# Patient Record
Sex: FEMALE | Race: WHITE | HISPANIC OR LATINO | Employment: OTHER | URBAN - METROPOLITAN AREA
[De-identification: names, ages, dates, MRNs, and addresses within clinical notes are randomized per-mention and may not be internally consistent; named-entity substitution may affect disease eponyms.]

---

## 2020-11-16 ENCOUNTER — APPOINTMENT (OUTPATIENT)
Dept: LAB | Facility: MEDICAL CENTER | Age: 70
End: 2020-11-16
Attending: PHYSICIAN ASSISTANT
Payer: MEDICARE

## 2020-11-17 ENCOUNTER — HOSPITAL ENCOUNTER (OUTPATIENT)
Dept: LAB | Facility: MEDICAL CENTER | Age: 70
End: 2020-11-17
Attending: PHYSICIAN ASSISTANT
Payer: MEDICARE

## 2020-11-17 LAB
ALBUMIN SERPL BCP-MCNC: 4.3 G/DL (ref 3.2–4.9)
ALBUMIN/GLOB SERPL: 1.3 G/DL
ALP SERPL-CCNC: 92 U/L (ref 30–99)
ALT SERPL-CCNC: 15 U/L (ref 2–50)
ANION GAP SERPL CALC-SCNC: 8 MMOL/L (ref 7–16)
AST SERPL-CCNC: 15 U/L (ref 12–45)
BILIRUB SERPL-MCNC: 0.5 MG/DL (ref 0.1–1.5)
BUN SERPL-MCNC: 17 MG/DL (ref 8–22)
CALCIUM SERPL-MCNC: 9.5 MG/DL (ref 8.5–10.5)
CHLORIDE SERPL-SCNC: 104 MMOL/L (ref 96–112)
CO2 SERPL-SCNC: 29 MMOL/L (ref 20–33)
CREAT SERPL-MCNC: 0.67 MG/DL (ref 0.5–1.4)
GLOBULIN SER CALC-MCNC: 3.2 G/DL (ref 1.9–3.5)
GLUCOSE SERPL-MCNC: 99 MG/DL (ref 65–99)
POTASSIUM SERPL-SCNC: 4.4 MMOL/L (ref 3.6–5.5)
PROT SERPL-MCNC: 7.5 G/DL (ref 6–8.2)
SODIUM SERPL-SCNC: 141 MMOL/L (ref 135–145)

## 2020-11-17 PROCEDURE — 80053 COMPREHEN METABOLIC PANEL: CPT

## 2020-11-17 PROCEDURE — 36415 COLL VENOUS BLD VENIPUNCTURE: CPT

## 2021-03-03 DIAGNOSIS — Z23 NEED FOR VACCINATION: ICD-10-CM

## 2021-06-30 ENCOUNTER — HOSPITAL ENCOUNTER (EMERGENCY)
Facility: MEDICAL CENTER | Age: 71
End: 2021-06-30
Attending: EMERGENCY MEDICINE
Payer: OTHER MISCELLANEOUS

## 2021-06-30 ENCOUNTER — APPOINTMENT (OUTPATIENT)
Dept: RADIOLOGY | Facility: MEDICAL CENTER | Age: 71
End: 2021-06-30
Attending: EMERGENCY MEDICINE
Payer: OTHER MISCELLANEOUS

## 2021-06-30 VITALS
SYSTOLIC BLOOD PRESSURE: 124 MMHG | BODY MASS INDEX: 38.28 KG/M2 | RESPIRATION RATE: 11 BRPM | OXYGEN SATURATION: 93 % | HEIGHT: 60 IN | HEART RATE: 86 BPM | TEMPERATURE: 97.9 F | DIASTOLIC BLOOD PRESSURE: 58 MMHG | WEIGHT: 195 LBS

## 2021-06-30 DIAGNOSIS — S20.211A CONTUSION OF RIGHT CHEST WALL, INITIAL ENCOUNTER: ICD-10-CM

## 2021-06-30 DIAGNOSIS — S30.1XXA CONTUSION OF ABDOMINAL WALL, INITIAL ENCOUNTER: ICD-10-CM
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
## 2021-06-30 DIAGNOSIS — N63.20 BREAST MASS, LEFT: ICD-10-CM

## 2021-06-30 LAB
ALBUMIN SERPL BCP-MCNC: 4.6 G/DL (ref 3.2–4.9)
ALBUMIN/GLOB SERPL: 1.5 G/DL
ALP SERPL-CCNC: 104 U/L (ref 30–99)
ALT SERPL-CCNC: 18 U/L (ref 2–50)
ANION GAP SERPL CALC-SCNC: 11 MMOL/L (ref 7–16)
APTT PPP: 32.5 SEC (ref 24.7–36)
AST SERPL-CCNC: 24 U/L (ref 12–45)
BASOPHILS # BLD AUTO: 0.5 % (ref 0–1.8)
BASOPHILS # BLD: 0.05 K/UL (ref 0–0.12)
BILIRUB SERPL-MCNC: 0.2 MG/DL (ref 0.1–1.5)
BUN SERPL-MCNC: 19 MG/DL (ref 8–22)
CALCIUM SERPL-MCNC: 9.3 MG/DL (ref 8.5–10.5)
CHLORIDE SERPL-SCNC: 105 MMOL/L (ref 96–112)
CO2 SERPL-SCNC: 25 MMOL/L (ref 20–33)
CREAT SERPL-MCNC: 0.73 MG/DL (ref 0.5–1.4)
EKG IMPRESSION: NORMAL
EOSINOPHIL # BLD AUTO: 0.31 K/UL (ref 0–0.51)
EOSINOPHIL NFR BLD: 3.2 % (ref 0–6.9)
ERYTHROCYTE [DISTWIDTH] IN BLOOD BY AUTOMATED COUNT: 43.8 FL (ref 35.9–50)
GLOBULIN SER CALC-MCNC: 3 G/DL (ref 1.9–3.5)
GLUCOSE SERPL-MCNC: 131 MG/DL (ref 65–99)
HCT VFR BLD AUTO: 38.5 % (ref 37–47)
HGB BLD-MCNC: 12.8 G/DL (ref 12–16)
IMM GRANULOCYTES # BLD AUTO: 0.07 K/UL (ref 0–0.11)
IMM GRANULOCYTES NFR BLD AUTO: 0.7 % (ref 0–0.9)
INR PPP: 1.03 (ref 0.87–1.13)
LYMPHOCYTES # BLD AUTO: 2.91 K/UL (ref 1–4.8)
LYMPHOCYTES NFR BLD: 30.3 % (ref 22–41)
MCH RBC QN AUTO: 30.6 PG (ref 27–33)
MCHC RBC AUTO-ENTMCNC: 33.2 G/DL (ref 33.6–35)
MCV RBC AUTO: 92.1 FL (ref 81.4–97.8)
MONOCYTES # BLD AUTO: 0.75 K/UL (ref 0–0.85)
MONOCYTES NFR BLD AUTO: 7.8 % (ref 0–13.4)
NEUTROPHILS # BLD AUTO: 5.5 K/UL (ref 2–7.15)
NEUTROPHILS NFR BLD: 57.5 % (ref 44–72)
NRBC # BLD AUTO: 0 K/UL
NRBC BLD-RTO: 0 /100 WBC
PLATELET # BLD AUTO: 232 K/UL (ref 164–446)
PMV BLD AUTO: 10.6 FL (ref 9–12.9)
POTASSIUM SERPL-SCNC: 3.8 MMOL/L (ref 3.6–5.5)
PROT SERPL-MCNC: 7.6 G/DL (ref 6–8.2)
PROTHROMBIN TIME: 13.2 SEC (ref 12–14.6)
RBC # BLD AUTO: 4.18 M/UL (ref 4.2–5.4)
SODIUM SERPL-SCNC: 141 MMOL/L (ref 135–145)
WBC # BLD AUTO: 9.6 K/UL (ref 4.8–10.8)

## 2021-06-30 PROCEDURE — 700111 HCHG RX REV CODE 636 W/ 250 OVERRIDE (IP): Performed by: EMERGENCY MEDICINE

## 2021-06-30 PROCEDURE — 72128 CT CHEST SPINE W/O DYE: CPT | Mod: MH

## 2021-06-30 PROCEDURE — 99285 EMERGENCY DEPT VISIT HI MDM: CPT

## 2021-06-30 PROCEDURE — 93005 ELECTROCARDIOGRAM TRACING: CPT | Performed by: EMERGENCY MEDICINE

## 2021-06-30 PROCEDURE — 80053 COMPREHEN METABOLIC PANEL: CPT

## 2021-06-30 PROCEDURE — 85730 THROMBOPLASTIN TIME PARTIAL: CPT

## 2021-06-30 PROCEDURE — 96375 TX/PRO/DX INJ NEW DRUG ADDON: CPT | Mod: XU

## 2021-06-30 PROCEDURE — 85025 COMPLETE CBC W/AUTO DIFF WBC: CPT

## 2021-06-30 PROCEDURE — 72125 CT NECK SPINE W/O DYE: CPT

## 2021-06-30 PROCEDURE — 73562 X-RAY EXAM OF KNEE 3: CPT | Mod: LT

## 2021-06-30 PROCEDURE — 73501 X-RAY EXAM HIP UNI 1 VIEW: CPT | Mod: LT

## 2021-06-30 PROCEDURE — 85610 PROTHROMBIN TIME: CPT

## 2021-06-30 PROCEDURE — 71260 CT THORAX DX C+: CPT | Mod: MH

## 2021-06-30 PROCEDURE — 96374 THER/PROPH/DIAG INJ IV PUSH: CPT | Mod: XU

## 2021-06-30 PROCEDURE — 93005 ELECTROCARDIOGRAM TRACING: CPT

## 2021-06-30 PROCEDURE — 70450 CT HEAD/BRAIN W/O DYE: CPT

## 2021-06-30 PROCEDURE — 700117 HCHG RX CONTRAST REV CODE 255: Performed by: EMERGENCY MEDICINE

## 2021-06-30 PROCEDURE — 72131 CT LUMBAR SPINE W/O DYE: CPT | Mod: MH

## 2021-06-30 RX ORDER — ONDANSETRON 2 MG/ML
4 INJECTION INTRAMUSCULAR; INTRAVENOUS ONCE
Status: COMPLETED | OUTPATIENT
Start: 2021-06-30 | End: 2021-06-30

## 2021-06-30 RX ORDER — CYCLOBENZAPRINE HCL 10 MG
10 TABLET ORAL 3 TIMES DAILY PRN
Qty: 30 TABLET | Refills: 0 | Status: SHIPPED | OUTPATIENT
Start: 2021-06-30 | End: 2021-08-30 | Stop reason: SDUPTHER

## 2021-06-30 RX ORDER — MORPHINE SULFATE 4 MG/ML
4 INJECTION, SOLUTION INTRAMUSCULAR; INTRAVENOUS ONCE
Status: COMPLETED | OUTPATIENT
Start: 2021-06-30 | End: 2021-06-30

## 2021-06-30 RX ORDER — HYDROCODONE BITARTRATE AND ACETAMINOPHEN 5; 325 MG/1; MG/1
1 TABLET ORAL EVERY 6 HOURS PRN
Qty: 10 TABLET | Refills: 0 | Status: SHIPPED | OUTPATIENT
Start: 2021-06-30 | End: 2021-07-03

## 2021-06-30 RX ADMIN — MORPHINE SULFATE 4 MG: 4 INJECTION INTRAVENOUS at 19:13

## 2021-06-30 RX ADMIN — IOHEXOL 100 ML: 350 INJECTION, SOLUTION INTRAVENOUS at 20:04

## 2021-06-30 RX ADMIN — ONDANSETRON 4 MG: 2 INJECTION INTRAMUSCULAR; INTRAVENOUS at 19:13

## 2021-07-01 NOTE — ED NOTES
Gibraltarian interpretor used for discharge instructions.   Discharge instructions given to patient, prescriptions provided, a verbal understanding of all instructions was stated. IV removed, cathlon intact, site without s/s of infection. Pt preferred to be taken by wheel chair out to lobby accompanied by family VSS,  all belongings accounted for.

## 2021-07-01 NOTE — ED NOTES
Report form Ester SCHROEDER, assume care. Pt medicated per MAR for pain, transported to imaging via [facility]
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
## 2021-07-02 ENCOUNTER — PATIENT OUTREACH (OUTPATIENT)
Dept: HEALTH INFORMATION MANAGEMENT | Facility: OTHER | Age: 71
End: 2021-07-02

## 2021-07-02 NOTE — PROGRESS NOTES
7.2.21   Received VM from [facility] to schedule patient with breast center for a breast mass. Patient will need to be seen with a PCP first to receive a referral. CHW called patient but the line was busy. CHW unable to leave a VM. CHW will call patient a second time to schedule PCP appointment.     7.7.21  CHW called patient a second time to schedule PCP appointment for breast center referral but there was no answer. CHW will call patient a third time to schedule PCP appointment.     7.9.21   CHW called patient a third time to schedule PCP appointment. Patient's line rang busy so CHW unable to leave a VM for patient. CHW will discharge patient from list due to inability to contact.

## 2021-07-12 ENCOUNTER — OFFICE VISIT (OUTPATIENT)
Dept: MEDICAL GROUP | Facility: PHYSICIAN GROUP | Age: 71
End: 2021-07-12
Payer: MEDICARE

## 2021-07-12 ENCOUNTER — TELEPHONE (OUTPATIENT)
Dept: SCHEDULING | Facility: IMAGING CENTER | Age: 71
End: 2021-07-12

## 2021-07-12 VITALS
TEMPERATURE: 97.8 F | BODY MASS INDEX: 41.43 KG/M2 | DIASTOLIC BLOOD PRESSURE: 74 MMHG | HEIGHT: 60 IN | SYSTOLIC BLOOD PRESSURE: 132 MMHG | HEART RATE: 83 BPM | WEIGHT: 211 LBS | OXYGEN SATURATION: 93 % | RESPIRATION RATE: 16 BRPM

## 2021-07-12 DIAGNOSIS — N63.20 MASS OF BREAST, LEFT: ICD-10-CM

## 2021-07-12 DIAGNOSIS — I10 ESSENTIAL HYPERTENSION: ICD-10-CM

## 2021-07-12 DIAGNOSIS — Z00.00 ENCOUNTER FOR HEALTH MAINTENANCE EXAMINATION IN ADULT: ICD-10-CM

## 2021-07-12 DIAGNOSIS — M54.16 LUMBAR RADICULOPATHY: ICD-10-CM

## 2021-07-12 DIAGNOSIS — Z13.1 ENCOUNTER FOR SCREENING FOR DIABETES MELLITUS: ICD-10-CM

## 2021-07-12 DIAGNOSIS — Z13.220 ENCOUNTER FOR SCREENING FOR LIPID DISORDER: ICD-10-CM

## 2021-07-12 DIAGNOSIS — F41.9 ANXIETY: ICD-10-CM

## 2021-07-12 DIAGNOSIS — Z13.29 SCREENING FOR THYROID DISORDER: ICD-10-CM

## 2021-07-12 DIAGNOSIS — Z12.31 ENCOUNTER FOR SCREENING MAMMOGRAM FOR MALIGNANT NEOPLASM OF BREAST: ICD-10-CM

## 2021-07-12 DIAGNOSIS — Z13.21 ENCOUNTER FOR VITAMIN DEFICIENCY SCREENING: ICD-10-CM

## 2021-07-12 DIAGNOSIS — R92.8 OTHER ABNORMAL AND INCONCLUSIVE FINDINGS ON DIAGNOSTIC IMAGING OF BREAST: ICD-10-CM
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
## 2021-07-12 PROCEDURE — 99205 OFFICE O/P NEW HI 60 MIN: CPT | Performed by: NURSE PRACTITIONER

## 2021-07-12 RX ORDER — ALPRAZOLAM 0.5 MG/1
0.5 TABLET ORAL NIGHTLY PRN
COMMUNITY
End: 2021-07-12

## 2021-07-12 RX ORDER — ALPRAZOLAM 0.5 MG/1
0.5 TABLET ORAL 2 TIMES DAILY PRN
Qty: 60 TABLET | Refills: 0 | Status: SHIPPED | OUTPATIENT
Start: 2021-07-12 | End: 2021-08-11

## 2021-07-12 RX ORDER — NAPROXEN 375 MG/1
375 TABLET ORAL 2 TIMES DAILY WITH MEALS
Qty: 60 TABLET | Refills: 2 | Status: SHIPPED | OUTPATIENT
Start: 2021-07-12 | End: 2021-07-12

## 2021-07-12 RX ORDER — HYDROCODONE BITARTRATE AND ACETAMINOPHEN 5; 325 MG/1; MG/1
1 TABLET ORAL EVERY 8 HOURS PRN
Qty: 30 TABLET | Refills: 0 | Status: SHIPPED | OUTPATIENT
Start: 2021-07-12 | End: 2021-07-22

## 2021-07-12 RX ORDER — ENALAPRIL MALEATE 10 MG/1
20 TABLET ORAL DAILY
COMMUNITY
End: 2021-10-21 | Stop reason: SDUPTHER

## 2021-07-12 RX ORDER — NAPROXEN 375 MG/1
375 TABLET ORAL 2 TIMES DAILY WITH MEALS
Qty: 60 TABLET | Refills: 2 | Status: SHIPPED | OUTPATIENT
Start: 2021-07-12 | End: 2021-10-21

## 2021-07-12 RX ORDER — LISINOPRIL 20 MG/1
20 TABLET ORAL DAILY
COMMUNITY
End: 2021-07-12 | Stop reason: CLARIF

## 2021-07-12 ASSESSMENT — FIBROSIS 4 INDEX: FIB4 SCORE: 1.66

## 2021-07-12 ASSESSMENT — PATIENT HEALTH QUESTIONNAIRE - PHQ9: CLINICAL INTERPRETATION OF PHQ2 SCORE: 0
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
## 2021-07-13 NOTE — ASSESSMENT & PLAN NOTE
This is a chronic issue.  Patient takes enalapril 20 mg.  She was prescribed this in [place].  Her blood pressure is well controlled in office today.
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
## 2021-07-14 ENCOUNTER — HOSPITAL ENCOUNTER (OUTPATIENT)
Dept: LAB | Facility: MEDICAL CENTER | Age: 71
End: 2021-07-14
Attending: NURSE PRACTITIONER
Payer: MEDICARE

## 2021-07-14 DIAGNOSIS — Z13.29 SCREENING FOR THYROID DISORDER: ICD-10-CM

## 2021-07-14 DIAGNOSIS — Z13.220 ENCOUNTER FOR SCREENING FOR LIPID DISORDER: ICD-10-CM

## 2021-07-14 DIAGNOSIS — Z13.21 ENCOUNTER FOR VITAMIN DEFICIENCY SCREENING: ICD-10-CM

## 2021-07-14 DIAGNOSIS — Z00.00 ENCOUNTER FOR HEALTH MAINTENANCE EXAMINATION IN ADULT: ICD-10-CM

## 2021-07-15 ENCOUNTER — HOSPITAL ENCOUNTER (OUTPATIENT)
Dept: RADIOLOGY | Facility: MEDICAL CENTER | Age: 71
End: 2021-07-15
Attending: NURSE PRACTITIONER
Payer: MEDICARE

## 2021-07-15 ENCOUNTER — TELEPHONE (OUTPATIENT)
Dept: MEDICAL GROUP | Facility: PHYSICIAN GROUP | Age: 71
End: 2021-07-15

## 2021-07-15 ENCOUNTER — HOSPITAL ENCOUNTER (OUTPATIENT)
Dept: LAB | Facility: MEDICAL CENTER | Age: 71
End: 2021-07-15
Attending: NURSE PRACTITIONER
Payer: MEDICARE

## 2021-07-15 DIAGNOSIS — M54.16 LUMBAR RADICULOPATHY: ICD-10-CM
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
## 2021-07-15 DIAGNOSIS — S33.140D: ICD-10-CM

## 2021-07-15 LAB
25(OH)D3 SERPL-MCNC: 34 NG/ML (ref 30–100)
CHOLEST SERPL-MCNC: 181 MG/DL (ref 100–199)
ERYTHROCYTE [DISTWIDTH] IN BLOOD BY AUTOMATED COUNT: 46.8 FL (ref 35.9–50)
EST. AVERAGE GLUCOSE BLD GHB EST-MCNC: 114 MG/DL
FASTING STATUS PATIENT QL REPORTED: NORMAL
HBA1C MFR BLD: 5.6 % (ref 4–5.6)
HCT VFR BLD AUTO: 38.5 % (ref 37–47)
HDLC SERPL-MCNC: 57 MG/DL
HGB BLD-MCNC: 12.5 G/DL (ref 12–16)
LDLC SERPL CALC-MCNC: 109 MG/DL
MCH RBC QN AUTO: 30.9 PG (ref 27–33)
MCHC RBC AUTO-ENTMCNC: 32.5 G/DL (ref 33.6–35)
MCV RBC AUTO: 95.1 FL (ref 81.4–97.8)
PLATELET # BLD AUTO: 224 K/UL (ref 164–446)
PMV BLD AUTO: 10.7 FL (ref 9–12.9)
RBC # BLD AUTO: 4.05 M/UL (ref 4.2–5.4)
T4 FREE SERPL-MCNC: 1.13 NG/DL (ref 0.93–1.7)
TRIGL SERPL-MCNC: 74 MG/DL (ref 0–149)
TSH SERPL DL<=0.005 MIU/L-ACNC: 1.91 UIU/ML (ref 0.38–5.33)
WBC # BLD AUTO: 6.8 K/UL (ref 4.8–10.8)

## 2021-07-15 PROCEDURE — 85027 COMPLETE CBC AUTOMATED: CPT

## 2021-07-15 PROCEDURE — 80061 LIPID PANEL: CPT

## 2021-07-15 PROCEDURE — 84443 ASSAY THYROID STIM HORMONE: CPT

## 2021-07-15 PROCEDURE — 82306 VITAMIN D 25 HYDROXY: CPT

## 2021-07-15 PROCEDURE — 71111 X-RAY EXAM RIBS/CHEST4/> VWS: CPT

## 2021-07-15 PROCEDURE — 72148 MRI LUMBAR SPINE W/O DYE: CPT | Mod: MH

## 2021-07-15 PROCEDURE — 83036 HEMOGLOBIN GLYCOSYLATED A1C: CPT | Mod: GA

## 2021-07-15 PROCEDURE — 84439 ASSAY OF FREE THYROXINE: CPT

## 2021-07-15 PROCEDURE — 36415 COLL VENOUS BLD VENIPUNCTURE: CPT

## 2021-07-15 NOTE — TELEPHONE ENCOUNTER
----- Message from GALLITO Krishnamurthy sent at 7/15/2021 10:54 AM PDT -----  Patient is Canadian-speaking only.Please call patient to inform her of her x-ray and MRI results.  The x-ray of her chest does not show any rib fracture.The MRI results show that she has some disc bulging that is most likely pressing on her nerves which is causing her numbness and tingling and weakness of her leg.  I do recommend sending her to physiatry, who does specialize in pain management, as well as physical therapy to help relieve some of her discomfort.  Physical therapy can also determine if patient will benefit from any mobility aids such as a walker or cane.

## 2021-07-16 ENCOUNTER — TELEPHONE (OUTPATIENT)
Dept: MEDICAL GROUP | Facility: PHYSICIAN GROUP | Age: 71
End: 2021-07-16

## 2021-07-16 NOTE — TELEPHONE ENCOUNTER
Phone Number Called: [phone] (home)       Call outcome: Spoke to patient regarding message below.    Message: pt advised.

## 2021-07-16 NOTE — TELEPHONE ENCOUNTER
----- Message from GALLITO Krishnamurthy sent at 7/15/2021  5:46 PM PDT -----  Please call patient let her know her lab results are back.    Patient's labs look great.  She does not have diabetes.  Her vitamin D level is normal, however, on the lower side.  I would recommend either a multivitamin and or getting enough sun exposure for her body to be able to produce her own vitamin D.    Her cholesterol looks good.  She only has some mild elevation in one of her cholesterol levels.  All other levels look great.  No changes are needed at this time.

## 2021-07-16 NOTE — TELEPHONE ENCOUNTER
Phone Number Called: [phone] (home)       Call outcome: Spoke to patient regarding message below.    Message: pt advised.

## 2021-07-27 ENCOUNTER — OFFICE VISIT (OUTPATIENT)
Dept: MEDICAL GROUP | Facility: PHYSICIAN GROUP | Age: 71
End: 2021-07-27
Payer: MEDICARE

## 2021-07-27 ENCOUNTER — TELEPHONE (OUTPATIENT)
Dept: MEDICAL GROUP | Facility: PHYSICIAN GROUP | Age: 71
End: 2021-07-27

## 2021-07-27 VITALS
BODY MASS INDEX: 42.13 KG/M2 | SYSTOLIC BLOOD PRESSURE: 124 MMHG | OXYGEN SATURATION: 96 % | WEIGHT: 209 LBS | HEIGHT: 59 IN | DIASTOLIC BLOOD PRESSURE: 74 MMHG | HEART RATE: 75 BPM | TEMPERATURE: 98.3 F | RESPIRATION RATE: 16 BRPM

## 2021-07-27 DIAGNOSIS — M54.16 LUMBAR RADICULOPATHY: ICD-10-CM

## 2021-07-27 DIAGNOSIS — S33.140D: ICD-10-CM
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
## 2021-07-27 DIAGNOSIS — Z02.89 ENCOUNTER FOR COMPLETION OF FORM WITH PATIENT: ICD-10-CM

## 2021-07-27 PROCEDURE — 7101 PR PHYSICAL: Performed by: NURSE PRACTITIONER

## 2021-07-27 ASSESSMENT — FIBROSIS 4 INDEX: FIB4 SCORE: 1.717259325738758273

## 2021-07-27 NOTE — TELEPHONE ENCOUNTER
VOICEMAIL  1. Caller Name: [facility]                      Call Back Number: n/a    2. Message: DX code for Mammo is not passing medicare     3. Patient approves office to leave a detailed voicemail/MyChart message: yes
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
## 2021-07-30 ENCOUNTER — PHYSICAL THERAPY (OUTPATIENT)
Dept: PHYSICAL THERAPY | Facility: REHABILITATION | Age: 71
End: 2021-07-30
Attending: NURSE PRACTITIONER
Payer: MEDICARE
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
## 2021-07-30 DIAGNOSIS — M54.16 LUMBAR RADICULOPATHY: ICD-10-CM

## 2021-07-30 PROCEDURE — 97110 THERAPEUTIC EXERCISES: CPT

## 2021-07-30 PROCEDURE — 97163 PT EVAL HIGH COMPLEX 45 MIN: CPT

## 2021-07-30 SDOH — ECONOMIC STABILITY: GENERAL: QUALITY OF LIFE: POOR

## 2021-07-30 ASSESSMENT — ENCOUNTER SYMPTOMS
PAIN SCALE: 8
PAIN SCALE AT LOWEST: 0
PAIN SCALE AT HIGHEST: 8
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
## 2021-08-03 ENCOUNTER — HOSPITAL ENCOUNTER (OUTPATIENT)
Dept: RADIOLOGY | Facility: MEDICAL CENTER | Age: 71
End: 2021-08-03
Attending: NURSE PRACTITIONER
Payer: MEDICARE

## 2021-08-03 DIAGNOSIS — R92.8 OTHER ABNORMAL AND INCONCLUSIVE FINDINGS ON DIAGNOSTIC IMAGING OF BREAST: ICD-10-CM

## 2021-08-03 DIAGNOSIS — N63.20 MASS OF BREAST, LEFT: ICD-10-CM

## 2021-08-03 PROCEDURE — G0279 TOMOSYNTHESIS, MAMMO: HCPCS

## 2021-08-03 PROCEDURE — 76642 ULTRASOUND BREAST LIMITED: CPT | Mod: RT

## 2021-08-04 ENCOUNTER — HOSPITAL ENCOUNTER (OUTPATIENT)
Dept: RADIOLOGY | Facility: MEDICAL CENTER | Age: 71
End: 2021-08-04
Attending: NURSE PRACTITIONER
Payer: MEDICARE

## 2021-08-04 DIAGNOSIS — R92.8 ABNORMAL FINDINGS ON DIAGNOSTIC IMAGING OF BREAST: ICD-10-CM

## 2021-08-04 LAB — PATHOLOGY CONSULT NOTE: NORMAL

## 2021-08-04 PROCEDURE — 88305 TISSUE EXAM BY PATHOLOGIST: CPT

## 2021-08-04 PROCEDURE — 19083 BX BREAST 1ST LESION US IMAG: CPT | Mod: LT

## 2021-08-18 ENCOUNTER — PHYSICAL THERAPY (OUTPATIENT)
Dept: PHYSICAL THERAPY | Facility: REHABILITATION | Age: 71
End: 2021-08-18
Attending: NURSE PRACTITIONER
Payer: MEDICARE

## 2021-08-18 DIAGNOSIS — M54.16 LUMBAR RADICULOPATHY: ICD-10-CM
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
## 2021-08-18 PROCEDURE — 97110 THERAPEUTIC EXERCISES: CPT

## 2021-08-18 PROCEDURE — 97112 NEUROMUSCULAR REEDUCATION: CPT

## 2021-08-18 NOTE — OP THERAPY DAILY TREATMENT
"  Outpatient Physical Therapy  DAILY TREATMENT     [facility] Physical Therapy [address].  [address]  [place] [address]  Phone:  [phone]  Fax:  [phone]    Date: 08/18/2021    Patient: Sarah Royal  YOB: 1950  MRN: [id number]     Time Calculation    Start time: 0857  Stop time: 0950 Time Calculation (min): 53 minutes         Chief Complaint: No chief complaint on file.    Visit #: 2    SUBJECTIVE:   online service used during visit:  Pt states she is doing better. She feels the exercises have helped and the medicines. She has 1/2 the pn. She is having vertigo and not lifting b/c of that. If she turns quickly or bends over or laying down and grabbing tv remote. If she closes her eyes it's better. She describes it as spinning w/ quick eye mvnts.   She still has pn in her leg. She can walk as much as she wants but does still have to rest from pn after 30 min. She can stand for 15 min now before she has to sit.     OBJECTIVE:  Pos L roll test    Therapeutic Exercises (CPT 62838):     1. LTR, x10, short of pn    2. Heel slides for n glide, x10    3. L/s standing ext, x10, HEP    4. Bridge, unable    5. SKTC , 2x30\", HEP    6. SLR, 2x10, HEP    19. POC: 10/22/21      Therapeutic Exercise Summary: Access Code: [email or web address]  URL: [email or web address]  Date: 08/18/2021  Prepared by: Martha King    Exercises  Standing Lumbar Extension - 4 x daily - 7 x weekly - 1 sets - 10 reps  Hooklying Single Knee to Chest Stretch - 1 x daily - 7 x weekly - 2 sets - 30 graeme armendarizz  Small Range Straight Leg Raise - 1 x daily - 7 x weekly - 2 sets - 10 reps        Therapeutic Treatments and Modalities:     1. Therapeutic Activities (CPT 29584), see below    2. Neuromuscular Re-education (CPT 76254), see below    Therapeutic Treatment and Modalities Summary: Use of SPC to ambulate throughout home and outside to dec fall risk. Walk frequently throughout day.    Neuro-joy: " "Positive R roll test for R horizontal canalithasis BPPV. Latency: 2\", duration 10\" Treatment: CAROLINE siu, well tolerated.     Time-based treatments/modalities:    Physical Therapy Timed Treatment Charges  Neuromusc re-ed, balance, coor, post minutes (CPT 39688): 20 minutes  Therapeutic exercise minutes (CPT 74616): 33 minutes      ASSESSMENT:   Pt presents w/ sig reduced radiating pn today. She cont's to have poor bed mobility and pain w/ transitions and twisting. She was educated in log roll but was unable d/t BPPV. Tx for BPPV was performed d/t limiting progress on l/s tx. PTx2 tx pt for R horizontal BPPV after screening. PT's assisted pt w/ bed mobility/rolling. Pt was nauseous and after tx for several min but had full alleviation of nausea w/ sitting after 3 min. Pt was walked out to daughter. Pt was educated to sleep on L side for 72 hrs and sleep w/ large pillow. Pt was unable to perform bridge d/t l/s pn and had inc'd tightness w/ SKTC. Pt had good response to repeated ext and was educated to perform when sx's radiate down leg. PT trialed light manual traction and pt had good relief of sx's. She will benefit from mechanical traction at next session.     Goals:   Short Term Goals:   Pt will be able to tie her shoes w/o assistance or inc'd pn >3/10  Pt will be able to walk outside w/ SPC for 10 min   Pt will be able to stand to cook dinner for 20 min before having to rest  Short term goal time span:  4-6 weeks      Long Term Goals:    Pt will be able to walk for 20+ min w/o AD or fear of falling  Pt will be able to pace back into chores w/o peripherilization of sx's.   Pt will demo neg bilat passive SLR  Pt will demo 9 (8/24) pt improvement on RMQ  Long term goal time span:  2-4 months    PLAN/RECOMMENDATIONS:   Follow up on dizziness and BPPV tx (pos R roll test).   Follow up if relief in sx's w/ l/s ext.   Trial mechanical traction   Trial progression into light repeated ext short of pn, bridge, pelvic tilts, " ERICK w/ tamra, ANNIE w/ ball

## 2021-08-23 ENCOUNTER — APPOINTMENT (OUTPATIENT)
Dept: MEDICAL GROUP | Facility: PHYSICIAN GROUP | Age: 71
End: 2021-08-23
Payer: MEDICARE

## 2021-08-25 ENCOUNTER — PHYSICAL THERAPY (OUTPATIENT)
Dept: PHYSICAL THERAPY | Facility: REHABILITATION | Age: 71
End: 2021-08-25
Attending: NURSE PRACTITIONER
Payer: MEDICARE

## 2021-08-25 DIAGNOSIS — M54.16 LUMBAR RADICULOPATHY: ICD-10-CM
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
## 2021-08-25 PROCEDURE — 97110 THERAPEUTIC EXERCISES: CPT

## 2021-08-25 PROCEDURE — 97112 NEUROMUSCULAR REEDUCATION: CPT

## 2021-08-25 NOTE — OP THERAPY DAILY TREATMENT
"  Outpatient Physical Therapy  DAILY TREATMENT     [facility] Physical [address].  [address]  [place] [address]  Phone:  [phone]  Fax:  [phone]    Date: 08/25/2021    Patient: Sarah Royal  YOB: 1950  MRN: [id number]     Time Calculation    Start time: 0945  Stop time: 1030 Time Calculation (min): 45 minutes         Chief Complaint: No chief complaint on file.    Visit #: 3    SUBJECTIVE:   online service used during visit:  Pt states she is less dizzy than last time. The dizziness still comes on when she bends over or turns her head to the R. She states her back is better in the morning but around 4 her back starts to hurt. She is lifting the baby which inc's her pain. She can't walk around with the baby d/t pn.     OBJECTIVE:  Pos L roll test    Therapeutic Exercises (CPT 94340):     1. LTR, x10, short of pn    2. Heel slides for n glide, x10    3. L/s standing ext, x10, HEP    4. Bridge, unable    5. SKTC , 2x30\", HEP    6. SLR, 2x10, HEP    7. Sit to stand, 2x10    8. Lifting technique, w/ purse to reproduce lifting from crib x10    9. 3 way hip, GTB 2x10    19. POC: 10/22/21      Therapeutic Exercise Summary: Access Code: [email or web address]  URL: [email or web address]  Date: 08/25/2021  Prepared by: Martha King    Exercises  Standing Lumbar Extension - 4 x daily - 7 x weekly - 1 sets - 10 reps  Hooklying Single Knee to Chest Stretch - 1 x daily - 7 x weekly - 2 sets - 30 segundos hold  Small Range Straight Leg Raise - 1 x daily - 7 x weekly - 2 sets - 10 reps  Standing Marching - 1 x daily - 7 x weekly - 2 sets - 10 reps  Standing Hip Abduction with Counter Support - 1 x daily - 7 x weekly - 2 sets - 10 reps  Standing Hip Extension with Counter Support - 1 x daily - 7 x weekly - 2 sets - 10 reps  Sit to Stand - 1 x daily - 7 x weekly - 2 sets - 10 reps        Therapeutic Treatments and Modalities:     1. Therapeutic Activities (CPT 23902), " "see below    2. Neuromuscular Re-education (CPT 63742), see below    Therapeutic Treatment and Modalities Summary: Functional lifting form for brennan    Neuro-joy: Positive L roll test for R horizontal canalithasis BPPV. Latency: 2\", duration 10\" Treatment: CAROLINE siu, well tolerated.     Time-based treatments/modalities:    Physical Therapy Timed Treatment Charges  Neuromusc re-ed, balance, coor, post minutes (CPT 17589): 10 minutes  Therapeutic exercise minutes (CPT 42950): 35 minutes      ASSESSMENT:   Pt cont's to present w/ improved ease of mvnt and slight improved bed mobility. She cont's to have dizziness w/ rolling and turning her head so cont'd BPPV tx which is preventing lying strengthening. Pt had less dizziness following tx today. She had mod weakness on R LE w/ strength progression requiring cuing for momentum and core engagement. Pt was educated in proper lifting form to prevent excess stress on l/s. Pt is progressing at this time and appropriate to cont PT at this time.    Goals:   Short Term Goals:   Pt will be able to tie her shoes w/o assistance or inc'd pn >3/10  Pt will be able to walk outside w/ SPC for 10 min   Pt will be able to stand to cook dinner for 20 min before having to rest  Short term goal time span:  4-6 weeks      Long Term Goals:    Pt will be able to walk for 20+ min w/o AD or fear of falling  Pt will be able to pace back into chores w/o peripherilization of sx's.   Pt will demo neg bilat passive SLR  Pt will demo 9 (8/24) pt improvement on RMQ  Long term goal time span:  2-4 months    PLAN/RECOMMENDATIONS:   Follow up on dizziness and BPPV tx (pos R roll test).   Follow up if relief in sx's w/ l/s ext.   Trial mechanical traction   Trial progression into light repeated ext short of pn, bridge, pelvic tilts, SKTC w/ ball, DKTC w/ ball  "

## 2021-08-27 ENCOUNTER — PHYSICAL THERAPY (OUTPATIENT)
Dept: PHYSICAL THERAPY | Facility: REHABILITATION | Age: 71
End: 2021-08-27
Attending: NURSE PRACTITIONER
Payer: MEDICARE

## 2021-08-27 DIAGNOSIS — M54.16 LUMBAR RADICULOPATHY: ICD-10-CM
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
## 2021-08-27 PROCEDURE — 97110 THERAPEUTIC EXERCISES: CPT

## 2021-08-27 PROCEDURE — 97112 NEUROMUSCULAR REEDUCATION: CPT

## 2021-08-27 NOTE — OP THERAPY DAILY TREATMENT
"  Outpatient Physical Therapy  DAILY TREATMENT     [facility] Physical [address].  [address]  [place] [address]  Phone:  [phone]  Fax:  [phone]    Date: 08/27/2021    Patient: Sarah Royal  YOB: 1950  MRN: [id number]     Time Calculation    Start time: 1315  Stop time: 1410 Time Calculation (min): 55 minutes         Chief Complaint: No chief complaint on file.    Visit #: 4    SUBJECTIVE:   online service used during visit:  Pt states that her back is doing better. She still gets tired at the end of the day but better. She is going to have surgery on her spine. She is having surgery in 3 weeks. She had been getting better. She is still having pn down her leg. Her foot goes numb after 10 min w/ standing, it goes away when she sits. She still has dizziness that is the same. She was dizzy after bending over to grab the remote.     OBJECTIVE:  Pos L roll test  Diminished L L5 myotomal strength (4/5)    Therapeutic Exercises (CPT 07712):     1. LTR, x10    2. Heel slides for n glide, x10    3. L/s standing ext, x10, HEP    4. Bridge, x15, pn    5. SKTC , 2x30\", HEP    6. SLR, 0, HEP    7. Sit to stand, 2x10    8. Lifting technique, reviewed     9. 3 way hip, GTB 2x10, at home    19. POC: 10/22/21      Therapeutic Exercise Summary: Access Code: [email or web address]  URL: [email or web address]  Date: 08/25/2021  Prepared by: Martha King    Exercises  Standing Lumbar Extension - 4 x daily - 7 x weekly - 1 sets - 10 reps  Hooklying Single Knee to Chest Stretch - 1 x daily - 7 x weekly - 2 sets - 30 segundos hold  Small Range Straight Leg Raise - 1 x daily - 7 x weekly - 2 sets - 10 reps  Standing Marching - 1 x daily - 7 x weekly - 2 sets - 10 reps  Standing Hip Abduction with Counter Support - 1 x daily - 7 x weekly - 2 sets - 10 reps  Standing Hip Extension with Counter Support - 1 x daily - 7 x weekly - 2 sets - 10 reps  Sit to Stand - 1 x daily - 7 x " "weekly - 2 sets - 10 reps        Therapeutic Treatments and Modalities:     1. Therapeutic Activities (CPT 81784), see below    2. Neuromuscular Re-education (CPT 88611), see below    3. E Stim Unattended (CPT 79147), IFC w/ heat to L/s w/ pt in sitting. Set to pt tolerance. Left w/ bell if needed.     Therapeutic Treatment and Modalities Summary: Functional lifting form for brennan    Neuro-joy: Positive L roll test for R horizontal canalithasis BPPV. Latency: 2\", duration 10\" Treatment: CAROLINE siu, well tolerated.     Time-based treatments/modalities:    Physical Therapy Timed Treatment Charges  Neuromusc re-ed, balance, coor, post minutes (CPT 54842): 10 minutes  Therapeutic exercise minutes (CPT 05730): 30 minutes      ASSESSMENT:   Pt had dec'd tolerance to bed mobility today w/ sharp local l/s pn and mm spasms. She had dec'd tolerance to rolling to BPPV tx today. Pt was advised to cont stretching to manage mm spasms. Pt was placed on IFC and heat to mange pn. Pt had minor myotomal weakness noted in L5 L myotome (4/5). PT will cont to monitor.     Goals:   Short Term Goals:   Pt will be able to tie her shoes w/o assistance or inc'd pn >3/10  Pt will be able to walk outside w/ SPC for 10 min   Pt will be able to stand to cook dinner for 20 min before having to rest  Short term goal time span:  4-6 weeks      Long Term Goals:    Pt will be able to walk for 20+ min w/o AD or fear of falling  Pt will be able to pace back into chores w/o peripherilization of sx's.   Pt will demo neg bilat passive SLR  Pt will demo 9 (8/24) pt improvement on RMQ  Long term goal time span:  2-4 months    PLAN/RECOMMENDATIONS:   No tx for BPPV, trail traction. Progress strengthening. Monitor L5 myotome  "

## 2021-08-30 ENCOUNTER — OFFICE VISIT (OUTPATIENT)
Dept: MEDICAL GROUP | Facility: PHYSICIAN GROUP | Age: 71
End: 2021-08-30
Payer: MEDICARE

## 2021-08-30 VITALS
DIASTOLIC BLOOD PRESSURE: 78 MMHG | TEMPERATURE: 98.2 F | RESPIRATION RATE: 16 BRPM | BODY MASS INDEX: 42.13 KG/M2 | HEART RATE: 73 BPM | WEIGHT: 209 LBS | HEIGHT: 59 IN | SYSTOLIC BLOOD PRESSURE: 120 MMHG | OXYGEN SATURATION: 95 %

## 2021-08-30 DIAGNOSIS — M54.16 LUMBAR RADICULOPATHY: ICD-10-CM

## 2021-08-30 DIAGNOSIS — R42 VERTIGO: ICD-10-CM

## 2021-08-30 PROCEDURE — 99214 OFFICE O/P EST MOD 30 MIN: CPT | Performed by: NURSE PRACTITIONER

## 2021-08-30 RX ORDER — MECLIZINE HCL 12.5 MG/1
12.5 TABLET ORAL 3 TIMES DAILY PRN
Qty: 90 TABLET | Refills: 0 | Status: SHIPPED | OUTPATIENT
Start: 2021-08-30 | End: 2021-10-21

## 2021-08-30 RX ORDER — CYCLOBENZAPRINE HCL 10 MG
10 TABLET ORAL 3 TIMES DAILY PRN
Qty: 90 TABLET | Refills: 1 | Status: SHIPPED | OUTPATIENT
Start: 2021-08-30 | End: 2021-10-21

## 2021-08-30 ASSESSMENT — FIBROSIS 4 INDEX: FIB4 SCORE: 1.742513139352563542
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
## 2021-08-30 NOTE — ASSESSMENT & PLAN NOTE
Pt reports increased vertigo, especailly in the morning. PT is helping patient with the epley manuver, however, this has not been helping. She also reports ear pain in her right ear. Pt reports vertigo every morning for the last 10 days.     Right TM is clear, but dull. It also appears that patient may have a small perforation, however, this is difficult to fully visualize as there is cerumen in the ear canal obstructing full view of the TM.     I will prescribe meclizine for the patient to use as needed.
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
## 2021-09-01 ENCOUNTER — PHYSICAL THERAPY (OUTPATIENT)
Dept: PHYSICAL THERAPY | Facility: REHABILITATION | Age: 71
End: 2021-09-01
Attending: NURSE PRACTITIONER
Payer: MEDICARE
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
## 2021-09-01 DIAGNOSIS — M54.16 LUMBAR RADICULOPATHY: ICD-10-CM

## 2021-09-01 PROCEDURE — 97110 THERAPEUTIC EXERCISES: CPT

## 2021-09-01 PROCEDURE — 97112 NEUROMUSCULAR REEDUCATION: CPT

## 2021-09-01 NOTE — OP THERAPY DAILY TREATMENT
"  Outpatient Physical Therapy  DAILY TREATMENT     [facility] Physical [address]  [address]  [place] [address]  Phone:  [phone]  Fax:  [phone]    Date: 09/01/2021    Patient: Sarah Royal  YOB: 1950  MRN: [id number]     Time Calculation    Start time: 0825  Stop time: 0920 Time Calculation (min): 55 minutes         Chief Complaint: No chief complaint on file.    Visit #: 5    SUBJECTIVE:   online service used during visit:  Pt states that the heat and IFC helped a lot last week. She has had less pain. She does not have a heating pad at home. The pn down her leg is the same. Her dizziness is better but still there when she bends over. She can move her head side to side now.       OBJECTIVE:  Pos L roll test  Diminished L L5 myotomal strength (4/5)    Therapeutic Exercises (CPT 00601):     1. LTR, 10x10\"    2. Heel slides for n glide, x10    3. L/s standing ext, x10, HEP    4. Bridge, 2x10, pn    5. SKTC , 0, HEP    6. SLR, 0, HEP    7. Sit to stand, 2x10, at home    8. Lifting technique, reviewed     9. 3 way hip, GTB 2x10, at home    10. Cat/cow, x10, poor control    11. Seated t/s rot, 10x10 \" ea    12. Seated l/s flex, 10x10\"    13. Pull backs, BTB 2x10    14. OH Y's, BTB 2x10    19. POC: 10/22/21      Therapeutic Exercise Summary: Access Code: [email or web address]  URL: [email or web address]  Date: 08/25/2021  Prepared by: Martha King    Exercises  Standing Lumbar Extension - 4 x daily - 7 x weekly - 1 sets - 10 reps  Hooklying Single Knee to Chest Stretch - 1 x daily - 7 x weekly - 2 sets - 30 segundos hold  Small Range Straight Leg Raise - 1 x daily - 7 x weekly - 2 sets - 10 reps  Standing Marching - 1 x daily - 7 x weekly - 2 sets - 10 reps  Standing Hip Abduction with Counter Support - 1 x daily - 7 x weekly - 2 sets - 10 reps  Standing Hip Extension with Counter Support - 1 x daily - 7 x weekly - 2 sets - 10 reps  Sit to Stand - 1 x daily " "- 7 x weekly - 2 sets - 10 reps        Therapeutic Treatments and Modalities:     1. Therapeutic Activities (CPT 05840), see below    2. Neuromuscular Re-education (CPT 18991), see below    3. E Stim Unattended (CPT 56313), IFC w/ heat to L/s w/ pt in supine w/ LE elevated over boslter. Set to pt tolerance. Left w/ bell if needed.     Therapeutic Treatment and Modalities Summary: Functional lifting form for brennan    Neuro-joy:  Postural strengthening above  (not today) Positive L roll test for R horizontal canalithasis BPPV. Latency: 2\", duration 10\" Treatment: R gufoni, well tolerated.     Time-based treatments/modalities:    Physical Therapy Timed Treatment Charges  Neuromusc re-ed, balance, coor, post minutes (CPT 96996): 10 minutes  Therapeutic exercise minutes (CPT 48319): 30 minutes      ASSESSMENT:   Pt conts to have tension through paraspinals contributing to pn. Pt was educated in benefits of gentle stretching vs forcing stretch. She had good tolerance to progression in OH strengthening. Focused session on t/s ext strengthening. Pt will cont to benefit from PT at this time.     Goals:   Short Term Goals:   Pt will be able to tie her shoes w/o assistance or inc'd pn >3/10  Pt will be able to walk outside w/ SPC for 10 min   Pt will be able to stand to cook dinner for 20 min before having to rest  Short term goal time span:  4-6 weeks      Long Term Goals:    Pt will be able to walk for 20+ min w/o AD or fear of falling  Pt will be able to pace back into chores w/o peripherilization of sx's.   Pt will demo neg bilat passive SLR  Pt will demo 9 (8/24) pt improvement on RMQ  Long term goal time span:  2-4 months    PLAN/RECOMMENDATIONS:   No tx for BPPV, trail traction. Progress strengthening. Monitor L5 myotome  "

## 2021-09-03 ENCOUNTER — PHYSICAL THERAPY (OUTPATIENT)
Dept: PHYSICAL THERAPY | Facility: REHABILITATION | Age: 71
End: 2021-09-03
Attending: NURSE PRACTITIONER
Payer: MEDICARE
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
## 2021-09-03 DIAGNOSIS — M54.16 LUMBAR RADICULOPATHY: ICD-10-CM

## 2021-09-03 PROCEDURE — 97014 ELECTRIC STIMULATION THERAPY: CPT

## 2021-09-03 PROCEDURE — 97140 MANUAL THERAPY 1/> REGIONS: CPT

## 2021-09-03 PROCEDURE — 97110 THERAPEUTIC EXERCISES: CPT

## 2021-09-03 NOTE — OP THERAPY DAILY TREATMENT
"  Outpatient Physical Therapy  DAILY TREATMENT     [facility] Physical [address]  [address]  [place] [address]  Phone:  [phone]  Fax:  [phone]    Date: 09/03/2021    Patient: Sarah Royal  YOB: 1950  MRN: [id number]     Time Calculation    Start time: 0730  Stop time: 0830 Time Calculation (min): 60 minutes         Chief Complaint: No chief complaint on file.    Visit #: 6    SUBJECTIVE:   online service used during visit:  Pt states that she woke up w/ pn in her lower mid back. It started yesterday for no reason. It hurt so she didn't exercise. She has not have any sx's in her leg at all.       OBJECTIVE:  Pos L roll test  Diminished L L5 myotomal strength (4/5)    Therapeutic Exercises (CPT 78590):     1. LTR, 10x10\"    2. Heel slides for n glide, x10    3. L/s standing ext, x10, HEP    4. Bridge, 2x10, pn    5. SKTC , 0, HEP    6. SLR, 0, HEP    7. Sit to stand, 2x10, at home    8. Lifting technique, reviewed     9. 3 way hip, GTB 2x10, at home    10. Cat/cow (quadruped and seated), x10 ea, poor control    11. Seated t/s rot, 10x10 \" ea    12. Seated l/s flex, 10x10\"    13. Pull backs, BTB 2x10    14. OH Y's, BTB 2x10    19. POC: 10/22/21      Therapeutic Exercise Summary: Access Code: [email or web address]  URL: [email or web address]  Date: 08/25/2021  Prepared by: Martha King    Exercises  Standing Lumbar Extension - 4 x daily - 7 x weekly - 1 sets - 10 reps  Hooklying Single Knee to Chest Stretch - 1 x daily - 7 x weekly - 2 sets - 30 segundos hold  Small Range Straight Leg Raise - 1 x daily - 7 x weekly - 2 sets - 10 reps  Standing Marching - 1 x daily - 7 x weekly - 2 sets - 10 reps  Standing Hip Abduction with Counter Support - 1 x daily - 7 x weekly - 2 sets - 10 reps  Standing Hip Extension with Counter Support - 1 x daily - 7 x weekly - 2 sets - 10 reps  Sit to Stand - 1 x daily - 7 x weekly - 2 sets - 10 reps        Therapeutic " "Treatments and Modalities:     1. Therapeutic Activities (CPT 03240), see below    2. Neuromuscular Re-education (CPT 39634), see below, not today    3. E Stim Unattended (CPT 66074), IFC w/ heat to L/s w/ pt in supine w/ LE elevated over boslter. Set to pt tolerance. Left w/ bell if needed.     4. Manual Therapy (CPT 27960), IASTM to thoracic paraspinals w/ pn in prone    Therapeutic Treatment and Modalities Summary: Functional lifting form for brennan    Neuro-joy:  Postural strengthening above  (not today) Positive L roll test for R horizontal canalithasis BPPV. Latency: 2\", duration 10\" Treatment: R gufoni, well tolerated.     Time-based treatments/modalities:    Physical Therapy Timed Treatment Charges  Manual therapy minutes (CPT 97606): 20 minutes  Therapeutic exercise minutes (CPT 13225): 20 minutes      ASSESSMENT:   Pt had sig inc'd tone in paraspinals w/ sig TTP. She had sig reduction in tension and improved ease of mvnt following STM. Pt was educated in breathing technique w/ stretching. Pt will cont to benefit from PT at this time d/t cont's pn. She presents w/ s/s of centrilization of radiating pn and is progressing well.     Goals:   Short Term Goals:   Pt will be able to tie her shoes w/o assistance or inc'd pn >3/10  Pt will be able to walk outside w/ SPC for 10 min   Pt will be able to stand to cook dinner for 20 min before having to rest  Short term goal time span:  4-6 weeks      Long Term Goals:    Pt will be able to walk for 20+ min w/o AD or fear of falling  Pt will be able to pace back into chores w/o peripherilization of sx's.   Pt will demo neg bilat passive SLR  Pt will demo 9 (8/24) pt improvement on RMQ  Long term goal time span:  2-4 months    PLAN/RECOMMENDATIONS:   No tx for BPPV, trail traction. Progress strengthening. Monitor L5 myotome  "

## 2021-09-08 ENCOUNTER — PHYSICAL THERAPY (OUTPATIENT)
Dept: PHYSICAL THERAPY | Facility: REHABILITATION | Age: 71
End: 2021-09-08
Attending: NURSE PRACTITIONER
Payer: MEDICARE
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
## 2021-09-08 DIAGNOSIS — M54.16 LUMBAR RADICULOPATHY: ICD-10-CM

## 2021-09-08 PROCEDURE — 97140 MANUAL THERAPY 1/> REGIONS: CPT

## 2021-09-08 PROCEDURE — 97110 THERAPEUTIC EXERCISES: CPT

## 2021-09-08 PROCEDURE — 97014 ELECTRIC STIMULATION THERAPY: CPT

## 2021-09-08 NOTE — OP THERAPY DAILY TREATMENT
"  Outpatient Physical Therapy  DAILY TREATMENT     [facility] Physical [address].  [address]  [place] [address]  Phone:  [phone]  Fax:  [phone]    Date: 09/08/2021    Patient: Sarah Royal  YOB: 1950  MRN: [id number]     Time Calculation    Start time: 0815  Stop time: 0915 Time Calculation (min): 60 minutes         Chief Complaint: No chief complaint on file.    Visit #: 7    SUBJECTIVE:   online service used during visit:  Pt states that she is doing better. She states that she still has trouble bending over. She feels the intensity of pn is a little better. Last night she picked up toys and her back hurt for 30 min. It improved w/ laying down. She will be going down to [place] to care for a sick family member. She is waiting to get her injection.     OBJECTIVE:  Pos L roll test  Diminished L L5 myotomal strength (4/5)    Therapeutic Exercises (CPT 19736):     1. LTR, 10x10\"    2. Heel slides for n glide, x10    3. L/s standing ext, x10, HEP    4. Bridge hold on bosu, 10x10\"    5. SKTC , 0, HEP    6. SLR, 0, HEP    7. Sit to stand, 2x10, at home    8. Lifting technique, reviewed     9. 3 way hip, GTB 2x10, at home    10. Cat/cow (quadruped and seated), x10 ea, improved control     11. Seated t/s rot, 10x10 \" ea    12. Seated l/s flex, 10x10\"    13. Pull backs, BTB 2x10    14. OH Y's, BTB 2x10    15. Pall of press, BTB 2x10    19. POC: 10/22/21      Therapeutic Exercise Summary: Access Code: [email or web address]  URL: [email or web address]  Date: 09/08/2021  Prepared by: Martha King    Exercises  Standing Anti-Rotation Press with Anchored Resistance - 1 x daily - 7 x weekly - 2 sets - 10 reps  Shoulder Extension with Resistance - 1 x daily - 7 x weekly - 2 sets - 10 reps  Shoulder Flexion with Anterior Anchored Resistance - 1 x daily - 7 x weekly - 2 sets - 10 reps  Supine Bridge - 1 x daily - 7 x weekly - 10 reps - 10 seconds hold  Supine Lower " "Trunk Rotation - 1 x daily - 7 x weekly - 10 reps - 10 seconds hold        Therapeutic Treatments and Modalities:     1. Therapeutic Activities (CPT 02379), see below    2. Neuromuscular Re-education (CPT 47930), see below, not today    3. E Stim Unattended (CPT 09348), IFC w/ heat to L/s w/ pt in supine w/ LE elevated over boslter. Set to pt tolerance. Left w/ bell if needed.     4. Manual Therapy (CPT 39091), IASTM to thoracic paraspinals w/ pn in prone    Therapeutic Treatment and Modalities Summary: Functional lifting form for brennan    Neuro-joy:  Postural strengthening above  (not today) Positive L roll test for R horizontal canalithasis BPPV. Latency: 2\", duration 10\" Treatment: R sherron, well tolerated.     Time-based treatments/modalities:    Physical Therapy Timed Treatment Charges  Manual therapy minutes (CPT 67238): 15 minutes  Therapeutic exercise minutes (CPT 40133): 30 minutes      ASSESSMENT:   Pt had sig improved reduction in tension in paraspinals today and less TTP. She had minor improved ROM w/ cat/cow and required less tactile cuing.  Pt had good tolerance to progression in resistance training w/ no spasms today. Pt will cont to benefit from PT at this time.     Goals:   Short Term Goals:   Pt will be able to tie her shoes w/o assistance or inc'd pn >3/10  Pt will be able to walk outside w/ SPC for 10 min   Pt will be able to stand to cook dinner for 20 min before having to rest  Short term goal time span:  4-6 weeks      Long Term Goals:    Pt will be able to walk for 20+ min w/o AD or fear of falling  Pt will be able to pace back into chores w/o peripherilization of sx's.   Pt will demo neg bilat passive SLR  Pt will demo 9 (8/24) pt improvement on RMQ  Long term goal time span:  2-4 months    PLAN/RECOMMENDATIONS:   No tx for BPPV, trail traction. Progress strengthening. Monitor L5 myotome  "

## 2021-09-10 ENCOUNTER — PHYSICAL THERAPY (OUTPATIENT)
Dept: PHYSICAL THERAPY | Facility: REHABILITATION | Age: 71
End: 2021-09-10
Attending: NURSE PRACTITIONER
Payer: MEDICARE
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
## 2021-09-10 DIAGNOSIS — M54.16 LUMBAR RADICULOPATHY: ICD-10-CM

## 2021-09-10 PROCEDURE — 97012 MECHANICAL TRACTION THERAPY: CPT | Mod: 59

## 2021-09-10 PROCEDURE — 97140 MANUAL THERAPY 1/> REGIONS: CPT

## 2021-09-10 PROCEDURE — 97110 THERAPEUTIC EXERCISES: CPT

## 2021-09-10 NOTE — OP THERAPY DAILY TREATMENT
"  Outpatient Physical Therapy  DAILY TREATMENT     [facility] Physical [address].  [address]  [place] [address]  Phone:  [phone]  Fax:  [phone]    Date: 09/10/2021    Patient: aSrah Royal  YOB: 1950  MRN: [id number]     Time Calculation    Start time: 0815  Stop time: 0915 Time Calculation (min): 60 minutes         Chief Complaint: No chief complaint on file.    Visit #: 8    SUBJECTIVE:   online service used during visit:  Pt states that she hasn't had an injection scheduled or approved. She is going to [place] for 6 months but wants to wait until her back feels better and complete therapy before she goes. She needs to be able to walk a lot. She still has pain down her L leg, the intensity is less than before. She has pn when she is standing still. She can walk 2 miles now. Her leg was numb at the end of the walk.     OBJECTIVE:  Pos L roll test  Diminished L L5 myotomal strength (4/5)    Therapeutic Exercises (CPT 20367):     1. LTR, 10x10\"    2. Heel slides for n glide, x10    3. L/s standing ext, x10, HEP    4. Bridge hold on bosu, 10x10\"    5. SKTC , 0, HEP    6. SLR, 0, HEP    7. Sit to stand, 2x10, at home    8. Lifting technique, reviewed     9. 3 way hip, GTB 2x10, at home    10. Cat/cow (quadruped and seated), x10 ea, improved control     11. Seated t/s rot, 10x10 \" ea    12. Seated l/s flex, 10x10\"    13. Pull backs, BTB 2x10    14. OH Y's, BTB 2x10    15. Pall of press, BTB 2x10    16. Alt superman, 5x10\" ea    19. POC: 10/22/21      Therapeutic Exercise Summary: Access Code: [email or web address]  URL: [email or web address]  Date: 09/08/2021  Prepared by: Martha King    Exercises  Standing Anti-Rotation Press with Anchored Resistance - 1 x daily - 7 x weekly - 2 sets - 10 reps  Shoulder Extension with Resistance - 1 x daily - 7 x weekly - 2 sets - 10 reps  Shoulder Flexion with Anterior Anchored Resistance - 1 x daily - 7 x weekly - 2 " "sets - 10 reps  Supine Bridge - 1 x daily - 7 x weekly - 10 reps - 10 seconds hold  Supine Lower Trunk Rotation - 1 x daily - 7 x weekly - 10 reps - 10 seconds hold        Therapeutic Treatments and Modalities:     1. Therapeutic Activities (CPT 45218), see below, not today    2. Neuromuscular Re-education (CPT 67309), see below, not today    3. E Stim Unattended (CPT 25338), IFC w/ heat to L/s w/ pt in supine w/ LE elevated over boslter. Set to pt tolerance. Left w/ bell if needed. , not today    4. Manual Therapy (CPT 66317), IASTM to thoracic paraspinals w/ pn in prone    5. Mechanical Traction (CPT 00999), to l/s in hooklying. 80/60#'s intermitent w/ 60\"on/20\"off. Left pt w/ bell if needed and checked in frequently. MHP to l/s.     Therapeutic Treatment and Modalities Summary: Functional lifting form for brennan    Neuro-joy:  Postural strengthening above  (not today) Positive L roll test for R horizontal canalithasis BPPV. Latency: 2\", duration 10\" Treatment: R johannafoni, well tolerated.     Time-based treatments/modalities:    Physical Therapy Timed Treatment Charges  Manual therapy minutes (CPT 95074): 15 minutes  Therapeutic exercise minutes (CPT 87698): 30 minutes      ASSESSMENT:   Pt had minor inc'd tension in lower paraspinals and multifidi. She demo'd improved overall strength w/ resistance training. She was educated to manage pn in standing w/ repeated ext or prone lying. She demo'd improved ROM w/cat/cow but still about 50% reduced from normal. Pt will cont to benefit from PT at this time.     Goals:   Short Term Goals:   Pt will be able to tie her shoes w/o assistance or inc'd pn >3/10  Pt will be able to walk outside w/ SPC for 10 min   Pt will be able to stand to cook dinner for 20 min before having to rest  Short term goal time span:  4-6 weeks      Long Term Goals:    Pt will be able to walk for 20+ min w/o AD or fear of falling  Pt will be able to pace back into chores w/o peripherilization of " sx's.   Pt will demo neg bilat passive SLR  Pt will demo 9 (8/24) pt improvement on RMQ  Long term goal time span:  2-4 months    PLAN/RECOMMENDATIONS:   Follow up on traction. No tx for BPPV, trail traction. Progress strengthening. Monitor L5 myotome

## 2021-09-15 ENCOUNTER — PHYSICAL THERAPY (OUTPATIENT)
Dept: PHYSICAL THERAPY | Facility: REHABILITATION | Age: 71
End: 2021-09-15
Attending: NURSE PRACTITIONER
Payer: MEDICARE

## 2021-09-15 DIAGNOSIS — M54.16 LUMBAR RADICULOPATHY: ICD-10-CM
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
## 2021-09-15 PROCEDURE — 97014 ELECTRIC STIMULATION THERAPY: CPT

## 2021-09-15 NOTE — OP THERAPY DAILY TREATMENT
"  Outpatient Physical Therapy  DAILY TREATMENT     [facility] Physical [address].  [address]  [place] [address]  Phone:  [phone]  Fax:  [phone]    Date: 09/15/2021    Patient: Sarah Roayl  YOB: 1950  MRN: [id number]     Time Calculation    Start time: 0815  Stop time: 0915 Time Calculation (min): 60 minutes         Chief Complaint: No chief complaint on file.    Visit #: 9    SUBJECTIVE:   online service used during visit:  Pt states that her back is improving. She is having more pn in her leg in the afternoon. Her back isn't hurting as much. She states the exercises help. The extensions help her walk farther. She is walking for 15 minutes. The injections aren't scheduled yet. She states she was a little sore right after the traction last time but felt great the rest of the day until 4 pm her whole body gets tired every day.     OBJECTIVE:  Pos L roll test  Diminished L L5 myotomal strength (4/5)  Neg SLR    Therapeutic Exercises (CPT 35788):     1. LTR, 4x10\"    2. Sciatic n glide, x10    3. L/s standing ext, x10, HEP    4. Bridge hold on PB, 4xfat    5. SKTC , 0, HEP    6. SLR, 0, HEP    7. Sit to stand, 2x10, at home    8. Lifting technique, reviewed     9. 3 way hip, 0, at home    10. Cat/cow , x10 , improved control     11. Seated t/s rot, 10x10 \" ea    12. Seated l/s flex, 10x10\"    13. Pull backs, 0    14. OH Y's, 0    15. Pall of press, 0    16. Alt superman, 10x10\" ea    17. Bridge, x25    19. POC: 10/22/21      Therapeutic Exercise Summary: Access Code: [email or web address]  URL: [email or web address]  Date: 09/08/2021  Prepared by: Martha King    Exercises  Standing Anti-Rotation Press with Anchored Resistance - 1 x daily - 7 x weekly - 2 sets - 10 reps  Shoulder Extension with Resistance - 1 x daily - 7 x weekly - 2 sets - 10 reps  Shoulder Flexion with Anterior Anchored Resistance - 1 x daily - 7 x weekly - 2 sets - 10 reps  Supine " "Bridge - 1 x daily - 7 x weekly - 10 reps - 10 seconds hold  Supine Lower Trunk Rotation - 1 x daily - 7 x weekly - 10 reps - 10 seconds hold        Therapeutic Treatments and Modalities:     1. Therapeutic Activities (CPT 22441), see below, not today    2. Neuromuscular Re-education (CPT 42038), see below, not today    3. E Stim Unattended (CPT 50329), IFC w/ heat to L/s w/ pt in supine w/ LE elevated over boslter. Set to pt tolerance. Left w/ bell if needed. , not today    4. Manual Therapy (CPT 52962), R UPA to L1-5 and central PA to T9-L2 gr III for all    5. Mechanical Traction (CPT 36616), to l/s in hooklying. 80/60#'s intermitent w/ 60\"on/20\"off. Left pt w/ bell if needed and checked in frequently. MHP to l/s.     Therapeutic Treatment and Modalities Summary: Functional lifting form for brennan    Neuro-joy:  Postural strengthening above  (not today) Positive L roll test for R horizontal canalithasis BPPV. Latency: 2\", duration 10\" Treatment: R sherron, well tolerated.     Time-based treatments/modalities:    Physical Therapy Timed Treatment Charges  Manual therapy minutes (CPT 10052): 15 minutes  Therapeutic exercise minutes (CPT 78029): 30 minutes      ASSESSMENT:   Pt had mod pn w/ R L1 UPA that improved w/ mobilization. She cont's to present w/ sig t/s hypomobility. She demo'd improved bed mobility. Pt is progressing well through strengthening at this time.     Goals:   Short Term Goals:   Pt will be able to tie her shoes w/o assistance or inc'd pn >3/10 (goal met)  Pt will be able to walk outside w/ SPC for 10 min   Pt will be able to stand to cook dinner for 20 min before having to rest  Short term goal time span:  4-6 weeks      Long Term Goals:    Pt will be able to walk for 20+ min w/o AD or fear of falling  Pt will be able to pace back into chores w/o peripherilization of sx's.   Pt will demo neg bilat passive SLR  Pt will demo 9 (8/24) pt improvement on RMQ  Long term goal time span:  2-4 " months    PLAN/RECOMMENDATIONS:   Do progress report  Follow up on traction. No tx for BPPV, trail traction. Progress strengthening. Monitor L5 myotome

## 2021-09-17 ENCOUNTER — PHYSICAL THERAPY (OUTPATIENT)
Dept: PHYSICAL THERAPY | Facility: REHABILITATION | Age: 71
End: 2021-09-17
Attending: NURSE PRACTITIONER
Payer: MEDICARE

## 2021-09-17 DIAGNOSIS — M54.16 LUMBAR RADICULOPATHY: ICD-10-CM
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
## 2021-09-17 PROCEDURE — 97140 MANUAL THERAPY 1/> REGIONS: CPT

## 2021-09-17 PROCEDURE — 97014 ELECTRIC STIMULATION THERAPY: CPT

## 2021-09-17 PROCEDURE — 97012 MECHANICAL TRACTION THERAPY: CPT | Mod: 59

## 2021-09-17 PROCEDURE — 97110 THERAPEUTIC EXERCISES: CPT
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
## 2021-09-17 NOTE — OP THERAPY DAILY TREATMENT
"  Outpatient Physical Therapy  DAILY TREATMENT     [facility] Physical [address]  [address]  [place] [address]  Phone:  [phone]  Fax:  [phone]    Date: 09/17/2021    Patient: Sarah Royal  YOB: 1950  MRN: [id number]     Time Calculation    Start time: 0730  Stop time: 0830 Time Calculation (min): 60 minutes         Chief Complaint: No chief complaint on file.    Visit #: 10    SUBJECTIVE:   online service used during visit:  Pt states that her leg is still tired and numb down her whole leg. It gets worse around 4pm. She does feel like she's getting better. She doesn't have an apt for her epidural yet.     OBJECTIVE:  Pos L roll test  Diminished L L5 myotomal strength (4/5)  Neg SLR  L/s AROM: WNL, no pn  Cont's sig tension in paraspinals     Therapeutic Exercises (CPT 06437):     1. LTR, 4x10\"    2. Sciatic n glide, x10    3. L/s standing ext, x10, HEP    4. Bridge hold on PB, 4xfat    5. SKTC , 0, HEP    6. SLR, 0, HEP    7. Sit to stand, 2x10, at home    8. Lifting technique, reviewed     9. 3 way hip, GTB x10    10. Cat/cow , x10 , improved control     11. Seated t/s rot, 10x10 \" ea    12. Seated l/s flex, 10x10\"    13. Pull backs, 0    14. OH Y's, 0    15. Pall of press, 0    16. Alt superman, 10x10\" ea    17. Bridge, x25    19. POC: 10/22/21      Therapeutic Exercise Summary: Access Code: [email or web address]  URL: [email or web address]  Date: 09/08/2021  Prepared by: Martha King    Exercises  Standing Anti-Rotation Press with Anchored Resistance - 1 x daily - 7 x weekly - 2 sets - 10 reps  Shoulder Extension with Resistance - 1 x daily - 7 x weekly - 2 sets - 10 reps  Shoulder Flexion with Anterior Anchored Resistance - 1 x daily - 7 x weekly - 2 sets - 10 reps  Supine Bridge - 1 x daily - 7 x weekly - 10 reps - 10 seconds hold  Supine Lower Trunk Rotation - 1 x daily - 7 x weekly - 10 reps - 10 seconds hold        Therapeutic Treatments and " "Modalities:     1. Therapeutic Activities (CPT 96898), see below, not today    2. Neuromuscular Re-education (CPT 18112), see below, not today    3. E Stim Unattended (CPT 22051), IFC w/ heat to L/s w/ pt in supine w/ LE elevated over boslter. Set to pt tolerance. Left w/ bell if needed. , not today    4. Manual Therapy (CPT 90139), R UPA to L1-5 and central PA to T9-L2 gr III for all    5. Mechanical Traction (CPT 68607), to l/s in hooklying. 80/60#'s intermitent w/ 60\"on/20\"off. Left pt w/ bell if needed and checked in frequently. MHP to l/s.     Therapeutic Treatment and Modalities Summary: Functional lifting form for brennan    Neuro-joy:  Postural strengthening above  (not today) Positive L roll test for R horizontal canalithasis BPPV. Latency: 2\", duration 10\" Treatment: R sherron, well tolerated.     Time-based treatments/modalities:    Physical Therapy Timed Treatment Charges  Manual therapy minutes (CPT 96703): 15 minutes  Therapeutic exercise minutes (CPT 48859): 30 minutes      ASSESSMENT:   Ms. Royal has attended 9 totals essions of PT since her IE. Over this period of time she has made sig improvements in l/s AROM, mm spasms, tension, functional lifting form, nerve mobility, and pain which has allowed her to walk for 20 min w/ a cane and pace into sweeping. She cont's to have pain w/ R UPA to L1-2, radaiting pain at the end of the day, core and glute weakness, and inc'd tone in paraspinals and will cont to benefit from PT at this time. Pt will be visiting [place] for 2 weeks but will be appropriate to return 1x/wk after trip. Pt is anticipated to cont to progress at this time.     Goals:   Short Term Goals:   Pt will be able to tie her shoes w/o assistance or inc'd pn >3/10 (goal met)  Pt will be able to walk outside w/ SPC for 10 min (Goal met- 20 min)  Pt will be able to stand to cook dinner for 20 min before having to rest (Progressing- still has pn but can cook, sits after 10-15 min)  Short term " goal time span:  4-6 weeks      Long Term Goals:    Pt will be able to walk for 20+ min w/o AD or fear of falling (Progressing- uses cane)  Pt will be able to pace back into chores w/o peripherilization of sx's. (Progressing-unable to mop but can broom)  Pt will demo neg bilat passive SLR (Goal met)  Pt will demo 9 (8/24) pt improvement on RMQ (Goal met)  Long term goal time span:  2-4 months    PLAN/RECOMMENDATIONS:   Follow up on [place].   Cont traction. Follow up on dizziness. Cont R UPAs. Progress strengthening. Monitor L5 myotome

## 2021-10-21 ENCOUNTER — OFFICE VISIT (OUTPATIENT)
Dept: MEDICAL GROUP | Facility: PHYSICIAN GROUP | Age: 71
End: 2021-10-21
Payer: MEDICARE

## 2021-10-21 VITALS
RESPIRATION RATE: 16 BRPM | BODY MASS INDEX: 42.33 KG/M2 | TEMPERATURE: 97.9 F | OXYGEN SATURATION: 95 % | HEART RATE: 74 BPM | SYSTOLIC BLOOD PRESSURE: 120 MMHG | WEIGHT: 210 LBS | DIASTOLIC BLOOD PRESSURE: 70 MMHG | HEIGHT: 59 IN

## 2021-10-21 DIAGNOSIS — N63.20 MASS OF BREAST, LEFT: ICD-10-CM

## 2021-10-21 DIAGNOSIS — M54.16 LUMBAR RADICULOPATHY: ICD-10-CM
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
## 2021-10-21 DIAGNOSIS — F41.9 ANXIETY: ICD-10-CM

## 2021-10-21 PROCEDURE — 99214 OFFICE O/P EST MOD 30 MIN: CPT | Performed by: NURSE PRACTITIONER

## 2021-10-21 RX ORDER — ALPRAZOLAM 0.5 MG/1
0.5 TABLET ORAL NIGHTLY PRN
Qty: 90 TABLET | Refills: 0 | Status: SHIPPED | OUTPATIENT
Start: 2021-10-21 | End: 2022-01-19

## 2021-10-21 RX ORDER — ENALAPRIL MALEATE 10 MG/1
20 TABLET ORAL DAILY
Qty: 90 TABLET | Refills: 3 | Status: SHIPPED | OUTPATIENT
Start: 2021-10-21 | End: 2022-10-31 | Stop reason: SDUPTHER

## 2021-10-21 ASSESSMENT — FIBROSIS 4 INDEX: FIB4 SCORE: 1.742513139352563542

## 2021-10-21 NOTE — PATIENT INSTRUCTIONS
[facility]  [address] [facility] [address] [facility] [address]  [place] [address]  Phone: [phone]   Fax: [phone]

## 2021-10-21 NOTE — ASSESSMENT & PLAN NOTE
Any takes xanax at night for anxiety. She has taken this for a while as she was prescribed this in [place].     Obtained and reviewed patient utilization report from [facility] pharmacy database on 10/21/2021 8:28 AM  prior to writing prescription for controlled substance II, III or IV per [place] bill . Based on assessment of the report,my physical exam if necessary and the patient's health problem, the prescription is medically necessary.     Continue xanax as needed at night for anxiety.
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
## 2021-10-21 NOTE — ASSESSMENT & PLAN NOTE
Patient reports relief in back pain with PT. She states she will still get pain down her leg is standing for more than 10 minutes. She did see the neurosurgeon. They discussed surgery but recommended less invasive injections prior to surgery.     Patient is agreeable to this. Pt will need to follow up with pain management for injections.

## 2021-11-18 ENCOUNTER — TELEPHONE (OUTPATIENT)
Dept: PHYSICAL THERAPY | Facility: REHABILITATION | Age: 71
End: 2021-11-18
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
## 2022-05-03 ENCOUNTER — OFFICE VISIT (OUTPATIENT)
Dept: MEDICAL GROUP | Facility: PHYSICIAN GROUP | Age: 72
End: 2022-05-03
Payer: MEDICARE

## 2022-05-03 VITALS
DIASTOLIC BLOOD PRESSURE: 74 MMHG | HEART RATE: 71 BPM | HEIGHT: 59 IN | OXYGEN SATURATION: 96 % | WEIGHT: 219.6 LBS | SYSTOLIC BLOOD PRESSURE: 114 MMHG | BODY MASS INDEX: 44.27 KG/M2 | TEMPERATURE: 97.6 F

## 2022-05-03 DIAGNOSIS — I10 PRIMARY HYPERTENSION: ICD-10-CM

## 2022-05-03 DIAGNOSIS — N60.02 CYST OF LEFT BREAST: ICD-10-CM

## 2022-05-03 DIAGNOSIS — E66.01 CLASS 3 SEVERE OBESITY WITH BODY MASS INDEX (BMI) OF 40.0 TO 44.9 IN ADULT, UNSPECIFIED OBESITY TYPE, UNSPECIFIED WHETHER SERIOUS COMORBIDITY PRESENT (HCC): ICD-10-CM

## 2022-05-03 DIAGNOSIS — D64.9 ANEMIA, UNSPECIFIED TYPE: ICD-10-CM

## 2022-05-03 DIAGNOSIS — N63.20 MASS OF BREAST, LEFT: ICD-10-CM

## 2022-05-03 DIAGNOSIS — F41.9 ANXIETY: ICD-10-CM

## 2022-05-03 DIAGNOSIS — M54.16 LUMBAR RADICULOPATHY: ICD-10-CM

## 2022-05-03 DIAGNOSIS — R63.5 WEIGHT GAIN: ICD-10-CM

## 2022-05-03 PROCEDURE — 99214 OFFICE O/P EST MOD 30 MIN: CPT | Performed by: NURSE PRACTITIONER

## 2022-05-03 RX ORDER — ALPRAZOLAM 0.5 MG/1
0.5 TABLET ORAL NIGHTLY PRN
COMMUNITY
End: 2022-11-08 | Stop reason: SDUPTHER

## 2022-05-03 RX ORDER — ALPRAZOLAM 0.5 MG/1
0.5 TABLET ORAL NIGHTLY PRN
Qty: 90 TABLET | Refills: 1 | Status: SHIPPED | OUTPATIENT
Start: 2022-05-03 | End: 2022-08-01

## 2022-05-03 RX ORDER — CELECOXIB 200 MG/1
200 CAPSULE ORAL 2 TIMES DAILY
Qty: 90 CAPSULE | Refills: 3 | Status: SHIPPED | OUTPATIENT
Start: 2022-05-03 | End: 2022-08-04 | Stop reason: SDUPTHER

## 2022-05-03 RX ORDER — GABAPENTIN 300 MG/1
300 CAPSULE ORAL 2 TIMES DAILY
Qty: 180 CAPSULE | Refills: 3 | Status: SHIPPED | OUTPATIENT
Start: 2022-05-03 | End: 2023-06-27 | Stop reason: SDUPTHER

## 2022-05-03 ASSESSMENT — FIBROSIS 4 INDEX: FIB4 SCORE: 1.742513139352563542

## 2022-05-03 ASSESSMENT — PATIENT HEALTH QUESTIONNAIRE - PHQ9: CLINICAL INTERPRETATION OF PHQ2 SCORE: 0

## 2022-05-03 NOTE — ASSESSMENT & PLAN NOTE
This is a chronic stable condition.  Patient does use Xanax nightly to help with her anxiety and sleep.    Obtained and reviewed patient utilization report from [facility] pharmacy database on 5/3/2022 10:49 AM  prior to writing prescription for controlled substance II, III or IV per [place] bill . Based on assessment of the report,my physical exam if necessary and the patient's health problem, the prescription is medically necessary.     Will refill patient's Xanax for her.

## 2022-05-03 NOTE — ASSESSMENT & PLAN NOTE
This is a new finding on labs that patient had done while she was in [place].  She reports her hemoglobin in [place] was at 10.    Will obtain follow-up labs as well as check iron studies.

## 2022-05-03 NOTE — ASSESSMENT & PLAN NOTE
This is a chronic condition.  Last August patient was found to have a left breast mass.  Diagnostic mammo and biopsy showed that it was a benign cystic mass.  Patient did have a follow-up mammogram while in [place] which did show that the cyst is slightly smaller.  Recommended continued 6 months follow-up for patient.    Will place 6-month follow-up diagnostic mammo for the summer.
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
## 2022-05-04 ENCOUNTER — HOSPITAL ENCOUNTER (OUTPATIENT)
Dept: LAB | Facility: MEDICAL CENTER | Age: 72
End: 2022-05-04
Attending: NURSE PRACTITIONER
Payer: MEDICARE

## 2022-05-04 DIAGNOSIS — R63.5 WEIGHT GAIN: ICD-10-CM

## 2022-05-04 DIAGNOSIS — D64.9 ANEMIA, UNSPECIFIED TYPE: ICD-10-CM

## 2022-05-04 LAB
ERYTHROCYTE [DISTWIDTH] IN BLOOD BY AUTOMATED COUNT: 50.5 FL (ref 35.9–50)
FOLATE SERPL-MCNC: 15 NG/ML
HCT VFR BLD AUTO: 43.3 % (ref 37–47)
HGB BLD-MCNC: 14.2 G/DL (ref 12–16)
IRON SATN MFR SERPL: 32 % (ref 15–55)
IRON SERPL-MCNC: 99 UG/DL (ref 40–170)
MCH RBC QN AUTO: 32.1 PG (ref 27–33)
MCHC RBC AUTO-ENTMCNC: 32.8 G/DL (ref 33.6–35)
MCV RBC AUTO: 98 FL (ref 81.4–97.8)
PLATELET # BLD AUTO: 241 K/UL (ref 164–446)
PMV BLD AUTO: 10.9 FL (ref 9–12.9)
RBC # BLD AUTO: 4.42 M/UL (ref 4.2–5.4)
T4 FREE SERPL-MCNC: 1.08 NG/DL (ref 0.93–1.7)
TIBC SERPL-MCNC: 307 UG/DL (ref 250–450)
TSH SERPL DL<=0.005 MIU/L-ACNC: 3.49 UIU/ML (ref 0.38–5.33)
UIBC SERPL-MCNC: 208 UG/DL (ref 110–370)
VIT B12 SERPL-MCNC: 1527 PG/ML (ref 211–911)
WBC # BLD AUTO: 5.5 K/UL (ref 4.8–10.8)

## 2022-05-04 PROCEDURE — 82746 ASSAY OF FOLIC ACID SERUM: CPT

## 2022-05-04 PROCEDURE — 83540 ASSAY OF IRON: CPT

## 2022-05-04 PROCEDURE — 82607 VITAMIN B-12: CPT

## 2022-05-04 PROCEDURE — 85027 COMPLETE CBC AUTOMATED: CPT

## 2022-05-04 PROCEDURE — 84439 ASSAY OF FREE THYROXINE: CPT

## 2022-05-04 PROCEDURE — 84443 ASSAY THYROID STIM HORMONE: CPT

## 2022-05-04 PROCEDURE — 36415 COLL VENOUS BLD VENIPUNCTURE: CPT

## 2022-05-04 PROCEDURE — 83550 IRON BINDING TEST: CPT

## 2022-05-11 ENCOUNTER — TELEPHONE (OUTPATIENT)
Dept: MEDICAL GROUP | Facility: PHYSICIAN GROUP | Age: 72
End: 2022-05-11
Payer: MEDICARE

## 2022-05-11 NOTE — TELEPHONE ENCOUNTER
Phone Number Called: [phone] (home)     Call outcome: Spoke to patient regarding message below.

## 2022-05-11 NOTE — TELEPHONE ENCOUNTER
----- Message from GALLITO Krishnamurthy sent at 5/11/2022  7:52 AM PDT -----  Please call patient and let her know that her labs look good. She does not have any sign of anemia or iron deficiency.

## 2022-06-03 ENCOUNTER — APPOINTMENT (OUTPATIENT)
Dept: RADIOLOGY | Facility: IMAGING CENTER | Age: 72
End: 2022-06-03
Attending: NURSE PRACTITIONER
Payer: MEDICARE

## 2022-06-03 ENCOUNTER — OFFICE VISIT (OUTPATIENT)
Dept: MEDICAL GROUP | Facility: PHYSICIAN GROUP | Age: 72
End: 2022-06-03
Payer: MEDICARE

## 2022-06-03 VITALS
WEIGHT: 215.9 LBS | DIASTOLIC BLOOD PRESSURE: 76 MMHG | SYSTOLIC BLOOD PRESSURE: 118 MMHG | HEART RATE: 73 BPM | HEIGHT: 59 IN | BODY MASS INDEX: 43.52 KG/M2 | OXYGEN SATURATION: 93 % | TEMPERATURE: 97.3 F

## 2022-06-03 DIAGNOSIS — R05.9 COUGH: ICD-10-CM
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
## 2022-06-03 DIAGNOSIS — R20.2 NUMBNESS AND TINGLING IN LEFT ARM: ICD-10-CM

## 2022-06-03 DIAGNOSIS — R20.0 NUMBNESS AND TINGLING IN LEFT ARM: ICD-10-CM

## 2022-06-03 DIAGNOSIS — M54.16 LUMBAR RADICULOPATHY: ICD-10-CM

## 2022-06-03 DIAGNOSIS — F41.9 ANXIETY: ICD-10-CM

## 2022-06-03 LAB
FLUAV+FLUBV AG SPEC QL IA: NEGATIVE
INT CON NEG: NEGATIVE
INT CON POS: POSITIVE

## 2022-06-03 PROCEDURE — 87804 INFLUENZA ASSAY W/OPTIC: CPT | Performed by: NURSE PRACTITIONER

## 2022-06-03 PROCEDURE — 99214 OFFICE O/P EST MOD 30 MIN: CPT | Performed by: NURSE PRACTITIONER

## 2022-06-03 RX ORDER — TRAMADOL HYDROCHLORIDE 50 MG/1
50 TABLET ORAL EVERY 8 HOURS PRN
Qty: 180 TABLET | Refills: 1 | Status: SHIPPED | OUTPATIENT
Start: 2022-06-03 | End: 2022-06-08 | Stop reason: RX

## 2022-06-03 RX ORDER — ALPRAZOLAM 0.5 MG/1
0.5 TABLET ORAL NIGHTLY PRN
Qty: 90 TABLET | Refills: 0 | Status: SHIPPED | OUTPATIENT
Start: 2022-08-03 | End: 2022-11-01

## 2022-06-03 ASSESSMENT — FIBROSIS 4 INDEX: FIB4 SCORE: 1.62
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
## 2022-06-08 DIAGNOSIS — M54.16 LUMBAR RADICULOPATHY: ICD-10-CM

## 2022-06-08 DIAGNOSIS — M54.9 CHRONIC BACK PAIN GREATER THAN 3 MONTHS DURATION: ICD-10-CM

## 2022-06-08 DIAGNOSIS — G89.29 CHRONIC BACK PAIN GREATER THAN 3 MONTHS DURATION: ICD-10-CM

## 2022-06-08 RX ORDER — TRAMADOL HYDROCHLORIDE 50 MG/1
50 TABLET ORAL EVERY 8 HOURS PRN
Qty: 180 TABLET | Refills: 0 | Status: SHIPPED | OUTPATIENT
Start: 2022-06-16 | End: 2022-09-14

## 2022-06-08 RX ORDER — TRAMADOL HYDROCHLORIDE 50 MG/1
50 TABLET ORAL EVERY 8 HOURS PRN
Qty: 14 TABLET | Refills: 0 | Status: SHIPPED | OUTPATIENT
Start: 2022-06-08 | End: 2022-06-15

## 2022-08-03 ENCOUNTER — TELEPHONE (OUTPATIENT)
Dept: MEDICAL GROUP | Facility: PHYSICIAN GROUP | Age: 72
End: 2022-08-03
Payer: MEDICARE

## 2022-08-03 DIAGNOSIS — M54.9 CHRONIC BACK PAIN GREATER THAN 3 MONTHS DURATION: ICD-10-CM

## 2022-08-03 DIAGNOSIS — G89.29 CHRONIC BACK PAIN GREATER THAN 3 MONTHS DURATION: ICD-10-CM

## 2022-08-03 NOTE — TELEPHONE ENCOUNTER
1. Name: Sarah Royal      Call Back Number: [phone] (home)         How would the patient prefer to be contacted with a response: Phone call OK to leave a detailed message    2. Which medication(s) is being requested?   celecoxib (CELEBREX) 200 MG Cap    3. What is the preferred Pharmacy?   [facility] [address]    Patient was informed they may receive a return phone call from our office with any additional questions before processing this request.

## 2022-08-04 RX ORDER — CELECOXIB 200 MG/1
200 CAPSULE ORAL 2 TIMES DAILY
Qty: 90 CAPSULE | Refills: 3 | Status: SHIPPED | OUTPATIENT
Start: 2022-08-04 | End: 2023-06-27 | Stop reason: SDUPTHER

## 2022-10-03 ENCOUNTER — NON-PROVIDER VISIT (OUTPATIENT)
Dept: MEDICAL GROUP | Facility: PHYSICIAN GROUP | Age: 72
End: 2022-10-03
Payer: MEDICARE

## 2022-10-03 DIAGNOSIS — Z23 NEED FOR VACCINATION: ICD-10-CM

## 2022-10-03 PROCEDURE — G0008 ADMIN INFLUENZA VIRUS VAC: HCPCS | Performed by: NURSE PRACTITIONER

## 2022-10-03 PROCEDURE — 90662 IIV NO PRSV INCREASED AG IM: CPT | Performed by: NURSE PRACTITIONER

## 2022-10-03 NOTE — NON-PROVIDER
"Sarah Royal is a 72 y.o. female here for a non-provider visit for:   FLU    Reason for immunization: Annual Flu Vaccine  Immunization records indicate need for vaccine: Yes, confirmed with Epic and confirmed with [place] Web[employer]  Minimum interval has been met for this vaccine: Yes  ABN completed: Not Indicated    VIS Dated  4/19 was given to patient: No  All IAC Questionnaire questions were answered \"No.\"    Patient tolerated injection and no adverse effects were observed or reported: Yes    Pt scheduled for next dose in series: Not Indicated    "

## 2022-11-02 RX ORDER — ENALAPRIL MALEATE 10 MG/1
20 TABLET ORAL DAILY
Qty: 60 TABLET | Refills: 0 | Status: SHIPPED | OUTPATIENT
Start: 2022-11-02 | End: 2022-11-08 | Stop reason: SDUPTHER

## 2022-11-08 ENCOUNTER — OFFICE VISIT (OUTPATIENT)
Dept: MEDICAL GROUP | Facility: PHYSICIAN GROUP | Age: 72
End: 2022-11-08
Payer: MEDICARE

## 2022-11-08 ENCOUNTER — TELEPHONE (OUTPATIENT)
Dept: MEDICAL GROUP | Facility: PHYSICIAN GROUP | Age: 72
End: 2022-11-08

## 2022-11-08 ENCOUNTER — APPOINTMENT (OUTPATIENT)
Dept: RADIOLOGY | Facility: IMAGING CENTER | Age: 72
End: 2022-11-08
Attending: NURSE PRACTITIONER
Payer: MEDICARE

## 2022-11-08 VITALS
SYSTOLIC BLOOD PRESSURE: 124 MMHG | OXYGEN SATURATION: 97 % | TEMPERATURE: 97.3 F | WEIGHT: 220 LBS | HEIGHT: 59 IN | BODY MASS INDEX: 44.35 KG/M2 | RESPIRATION RATE: 16 BRPM | HEART RATE: 70 BPM | DIASTOLIC BLOOD PRESSURE: 70 MMHG

## 2022-11-08 DIAGNOSIS — G89.29 CHRONIC PAIN OF RIGHT KNEE: ICD-10-CM

## 2022-11-08 DIAGNOSIS — Z23 NEED FOR VACCINATION: ICD-10-CM

## 2022-11-08 DIAGNOSIS — M25.561 CHRONIC PAIN OF RIGHT KNEE: ICD-10-CM

## 2022-11-08 DIAGNOSIS — M54.16 LUMBAR RADICULOPATHY: ICD-10-CM

## 2022-11-08 DIAGNOSIS — F41.9 ANXIETY: Chronic | ICD-10-CM

## 2022-11-08 PROBLEM — R05.9 COUGH: Status: RESOLVED | Noted: 2022-06-03 | Resolved: 2022-11-08

## 2022-11-08 PROCEDURE — 73564 X-RAY EXAM KNEE 4 OR MORE: CPT | Mod: TC,RT | Performed by: STUDENT IN AN ORGANIZED HEALTH CARE EDUCATION/TRAINING PROGRAM

## 2022-11-08 PROCEDURE — G0009 ADMIN PNEUMOCOCCAL VACCINE: HCPCS | Performed by: NURSE PRACTITIONER

## 2022-11-08 PROCEDURE — 90472 IMMUNIZATION ADMIN EACH ADD: CPT | Performed by: NURSE PRACTITIONER

## 2022-11-08 PROCEDURE — 90715 TDAP VACCINE 7 YRS/> IM: CPT | Performed by: NURSE PRACTITIONER

## 2022-11-08 PROCEDURE — 90677 PCV20 VACCINE IM: CPT | Performed by: NURSE PRACTITIONER

## 2022-11-08 PROCEDURE — 99214 OFFICE O/P EST MOD 30 MIN: CPT | Mod: 25 | Performed by: NURSE PRACTITIONER

## 2022-11-08 RX ORDER — OXYCODONE HYDROCHLORIDE AND ACETAMINOPHEN 5; 325 MG/1; MG/1
TABLET ORAL
COMMUNITY
Start: 2022-09-01 | End: 2022-11-08

## 2022-11-08 RX ORDER — ALPRAZOLAM 0.5 MG/1
.5-1 TABLET ORAL NIGHTLY PRN
Qty: 90 TABLET | Refills: 0 | Status: SHIPPED | OUTPATIENT
Start: 2022-11-08 | End: 2022-11-08

## 2022-11-08 RX ORDER — ENALAPRIL MALEATE 10 MG/1
20 TABLET ORAL DAILY
Qty: 90 TABLET | Refills: 3 | Status: SHIPPED | OUTPATIENT
Start: 2022-11-08 | End: 2023-06-27 | Stop reason: SDUPTHER

## 2022-11-08 RX ORDER — ALPRAZOLAM 1 MG/1
.5-1 TABLET ORAL NIGHTLY PRN
Qty: 90 TABLET | Refills: 0 | Status: SHIPPED | OUTPATIENT
Start: 2022-11-08 | End: 2023-02-06

## 2022-11-08 ASSESSMENT — FIBROSIS 4 INDEX: FIB4 SCORE: 1.64

## 2022-11-08 NOTE — ASSESSMENT & PLAN NOTE
This is a chronic condition. Patient takes xanax nightly to help with her anxiety and sleep. She is traveling to [place] until April.     Will refill xanax prior to patient leaving for [place].     Obtained and reviewed patient utilization report from [facility] pharmacy database on 11/8/2022 9:30 AM  prior to writing prescription for controlled substance II, III or IV per [place] bill . Based on assessment of the report,my physical exam if necessary and the patient's health problem, the prescription is medically necessary.

## 2022-11-08 NOTE — ASSESSMENT & PLAN NOTE
This is a chronic condition. Patient states that her knee has been giving out on her. She states that her knee will give out when she walks long distances or when she is going down the steps.     Will start with xray today.

## 2022-11-08 NOTE — TELEPHONE ENCOUNTER
----- Message from GALLITO Krishnamurthy sent at 11/8/2022 12:55 PM PST -----  Please call patient let her know that her knee x-ray does show moderate to severe osteoarthritis.  She does have some bone spurs and there as well as some fluid in the joint.  This is most likely the cause of her pain and her knee buckling.  When she does get back from [place] we will get her referral to orthopedics.

## 2022-11-08 NOTE — ASSESSMENT & PLAN NOTE
This is a chronic condition. Patient had back surgery in August and reports that her pain has been much better. She has also been walking better, although, she does still use a cane.        Impaired

## 2022-11-08 NOTE — PROGRESS NOTES
"  Knee Problem (R, had back surgery 08/30/22,  feels unsteady )                                                                                                                                         HPI:   Sarah presents today with the following.    Problem   Chronic Pain of Right Knee   Anxiety   Lumbar Radiculopathy   Cough (Resolved)       Current Outpatient Medications   Medication Sig Dispense Refill    ALPRAZolam (XANAX) 1 MG Tab Take 0.5-1 Tablets by mouth at bedtime as needed for Sleep or Anxiety for up to 90 days. 90 Tablet 0    enalapril (VASOTEC) 10 MG Tab Take 2 Tablets by mouth every day. 90 Tablet 3    celecoxib (CELEBREX) 200 MG Cap Take 1 Capsule by mouth 2 times a day. 90 Capsule 3    gabapentin (NEURONTIN) 300 MG Cap Take 1 Capsule by mouth 2 times a day. 180 Capsule 3     No current facility-administered medications for this visit.       Allergies as of 11/08/2022    (No Known Allergies)        ROS:  All systems negative expect as addressed in assessment and plan.     /70 (BP Location: Left arm, Patient Position: Sitting, BP Cuff Size: Adult)   Pulse 70   Temp 36.3 °C (97.3 °F) (Temporal)   Resp 16   Ht 1.499 m (4' 11\")   Wt 99.8 kg (220 lb)   SpO2 97%   BMI 44.43 kg/m²       Physical Exam  Vitals reviewed.   Constitutional:       Appearance: Normal appearance.   HENT:      Head: Normocephalic and atraumatic.      Mouth/Throat:      Mouth: Mucous membranes are moist.   Eyes:      Extraocular Movements: Extraocular movements intact.      Conjunctiva/sclera: Conjunctivae normal.   Pulmonary:      Effort: Pulmonary effort is normal.   Musculoskeletal:         General: Normal range of motion.      Cervical back: Normal range of motion.      Right knee: Swelling present. Tenderness present.   Skin:     General: Skin is warm and dry.   Neurological:      General: No focal deficit present.      Mental Status: She is alert and oriented to person, place, and time.   Psychiatric:         Mood " and Affect: Mood normal.         Behavior: Behavior normal.         Thought Content: Thought content normal.         Assessment and Plan:  72 y.o. female with the following issues.    1. Need for vaccination  Pneumococcal Conjugate Vaccine 20-Valent (19 yrs+)    TDAP VACCINE =>6YO IM      2. Anxiety  ALPRAZolam (XANAX) 1 MG Tab    DISCONTINUED: ALPRAZolam (XANAX) 0.5 MG Tab      3. Lumbar radiculopathy        4. Chronic pain of right knee  DX-KNEE COMPLETE 4+ RIGHT           Lumbar radiculopathy  This is a chronic condition. Patient had back surgery in August and reports that her pain has been much better. She has also been walking better, although, she does still use a cane.         Chronic pain of right knee  This is a chronic condition. Patient states that her knee has been giving out on her. She states that her knee will give out when she walks long distances or when she is going down the steps.     Will start with xray today.         Anxiety  This is a chronic condition. Patient takes xanax nightly to help with her anxiety and sleep. She is traveling to [place] until April.     Will refill xanax prior to patient leaving for [place].     Obtained and reviewed patient utilization report from [facility] pharmacy database on 11/8/2022 9:30 AM  prior to writing prescription for controlled substance II, III or IV per [place] bill . Based on assessment of the report,my physical exam if necessary and the patient's health problem, the prescription is medically necessary.       Return in about 6 months (around 5/8/2023) for follow up for anxiety and knee pain.      I have placed the below orders and discussed them with an approved delegating provider.  The MA is performing the below orders under the direction of Dr. Villa.    Please note that this dictation was created using voice recognition software. I have worked with consultants from the vendor as well as technical experts from [employer] to optimize the  interface. I have made every reasonable attempt to correct obvious errors, but I expect that there are errors of grammar and possibly content that I did not discover before finalizing the note.

## 2022-11-08 NOTE — TELEPHONE ENCOUNTER
Phone Number Called: [phone] (home)       Call outcome: Spoke to patient regarding message below.    Message: Spoke to daughter in Occitan and informed of results. No other questions at this time.

## 2023-06-27 ENCOUNTER — OFFICE VISIT (OUTPATIENT)
Dept: MEDICAL GROUP | Facility: PHYSICIAN GROUP | Age: 73
End: 2023-06-27
Payer: MEDICARE

## 2023-06-27 VITALS
WEIGHT: 214.2 LBS | HEIGHT: 59 IN | OXYGEN SATURATION: 94 % | HEART RATE: 73 BPM | BODY MASS INDEX: 43.18 KG/M2 | TEMPERATURE: 98.9 F | SYSTOLIC BLOOD PRESSURE: 98 MMHG | DIASTOLIC BLOOD PRESSURE: 57 MMHG

## 2023-06-27 DIAGNOSIS — M54.16 LUMBAR RADICULOPATHY: ICD-10-CM

## 2023-06-27 DIAGNOSIS — N10 ACUTE PYELONEPHRITIS: ICD-10-CM

## 2023-06-27 DIAGNOSIS — M54.9 CHRONIC BACK PAIN GREATER THAN 3 MONTHS DURATION: ICD-10-CM

## 2023-06-27 DIAGNOSIS — F41.9 ANXIETY: Chronic | ICD-10-CM

## 2023-06-27 DIAGNOSIS — K21.9 GASTROESOPHAGEAL REFLUX DISEASE WITHOUT ESOPHAGITIS: ICD-10-CM

## 2023-06-27 DIAGNOSIS — G89.29 CHRONIC BACK PAIN GREATER THAN 3 MONTHS DURATION: ICD-10-CM

## 2023-06-27 PROCEDURE — 3078F DIAST BP <80 MM HG: CPT | Performed by: NURSE PRACTITIONER

## 2023-06-27 PROCEDURE — 99214 OFFICE O/P EST MOD 30 MIN: CPT | Performed by: NURSE PRACTITIONER

## 2023-06-27 PROCEDURE — 3074F SYST BP LT 130 MM HG: CPT | Performed by: NURSE PRACTITIONER

## 2023-06-27 RX ORDER — OMEPRAZOLE 20 MG/1
20 CAPSULE, DELAYED RELEASE ORAL DAILY
Qty: 90 CAPSULE | Refills: 3 | Status: SHIPPED | OUTPATIENT
Start: 2023-06-27 | End: 2024-03-21

## 2023-06-27 RX ORDER — CELECOXIB 200 MG/1
200 CAPSULE ORAL 2 TIMES DAILY
Qty: 90 CAPSULE | Refills: 3 | Status: SHIPPED | OUTPATIENT
Start: 2023-06-27 | End: 2024-03-21

## 2023-06-27 RX ORDER — ALPRAZOLAM 0.5 MG/1
0.5 TABLET ORAL
Qty: 90 TABLET | Refills: 0 | Status: SHIPPED | OUTPATIENT
Start: 2023-06-27 | End: 2023-09-25

## 2023-06-27 RX ORDER — CEFDINIR 300 MG/1
300 CAPSULE ORAL 2 TIMES DAILY
COMMUNITY
Start: 2023-06-26 | End: 2023-09-26

## 2023-06-27 RX ORDER — GABAPENTIN 300 MG/1
300 CAPSULE ORAL 2 TIMES DAILY
Qty: 180 CAPSULE | Refills: 3 | Status: SHIPPED | OUTPATIENT
Start: 2023-06-27 | End: 2023-10-12

## 2023-06-27 RX ORDER — ENALAPRIL MALEATE 10 MG/1
20 TABLET ORAL DAILY
Qty: 90 TABLET | Refills: 3 | Status: SHIPPED | OUTPATIENT
Start: 2023-06-27 | End: 2024-03-21

## 2023-06-27 ASSESSMENT — FIBROSIS 4 INDEX: FIB4 SCORE: 1.74

## 2023-06-27 ASSESSMENT — PATIENT HEALTH QUESTIONNAIRE - PHQ9: CLINICAL INTERPRETATION OF PHQ2 SCORE: 0

## 2023-06-27 NOTE — ASSESSMENT & PLAN NOTE
Chronic stable. Patient uses xanax 1 time daily as needed for her anxiety. Patient uses this sparingly.     Obtained and reviewed patient utilization report from [facility] pharmacy database on 6/27/2023 12:01 PM  prior to writing prescription for controlled substance II, III or IV per [place] bill . Based on assessment of the report,my physical exam if necessary and the patient's health problem, the prescription is medically necessary.     Will refill xanax 0.5mg daily as needed.

## 2023-06-27 NOTE — ASSESSMENT & PLAN NOTE
Chronic stable. Patient reports heartburn and increased acid.  She is not currently taking anything to help for this.    Start omeprazole 20mg daily.

## 2023-06-27 NOTE — ASSESSMENT & PLAN NOTE
Patient was recently at the hospital and found to have a urinary tract infection. She states that she developed a fever, headache, malaise, and N/V. She was seen in the ER on Sunday. Labs and UA showed infection. She was provided a dose of IV antibiotics in the ER and sent home with cefdinir. She started the PO abx yesterday. She states that early this morning she had fever and continues to have periodic headaches. She also states that she currently has flank pain bilaterally.     She denies any history of kidney stones.     Patient to continue oral antibiotic. Will request urine culture from [facility].

## 2023-06-27 NOTE — PROGRESS NOTES
" services were used in the patient's primary language of Belgian.     Name or Number: Jaswant  Mode of interpretation: iPad       Chief Complaint   Patient presents with    [facility]    Medication Refill     Xanax                                                                                                                                       HPI:   Sarah presents today with the following.    Problem   Acute Pyelonephritis   Gastroesophageal Reflux Disease Without Esophagitis   Anxiety   Lumbar Radiculopathy       Current Outpatient Medications   Medication Sig Dispense Refill    celecoxib (CELEBREX) 200 MG Cap Take 1 Capsule by mouth 2 times a day. 90 Capsule 3    enalapril (VASOTEC) 10 MG Tab Take 2 Tablets by mouth every day. 90 Tablet 3    gabapentin (NEURONTIN) 300 MG Cap Take 1 Capsule by mouth 2 times a day. 180 Capsule 3    ALPRAZolam (XANAX) 0.5 MG Tab Take 1 Tablet by mouth 1 time a day as needed for Anxiety for up to 90 days. Indications: Feeling Anxious 90 Tablet 0    omeprazole (PRILOSEC) 20 MG delayed-release capsule Take 1 Capsule by mouth every day. 90 Capsule 3    cefdinir (OMNICEF) 300 MG Cap Take 300 mg by mouth 2 times a day.       No current facility-administered medications for this visit.       Allergies as of 06/27/2023    (No Known Allergies)        ROS:  All systems negative expect as addressed in assessment and plan.     BP 98/57 (BP Location: Right arm, Patient Position: Sitting, BP Cuff Size: Adult)   Pulse 73   Temp 37.2 °C (98.9 °F) (Temporal)   Ht 1.499 m (4' 11\")   Wt 97.2 kg (214 lb 3.2 oz)   SpO2 94%   BMI 43.26 kg/m²       Physical Exam  Vitals reviewed.   Constitutional:       Appearance: Normal appearance.   HENT:      Head: Normocephalic and atraumatic.      Mouth/Throat:      Mouth: Mucous membranes are moist.   Eyes:      Extraocular Movements: Extraocular movements intact.      Conjunctiva/sclera: " Conjunctivae normal.   Pulmonary:      Effort: Pulmonary effort is normal.   Abdominal:      Tenderness: There is right CVA tenderness and left CVA tenderness.   Musculoskeletal:         General: Normal range of motion.      Cervical back: Normal range of motion.   Skin:     General: Skin is warm and dry.   Neurological:      General: No focal deficit present.      Mental Status: She is alert and oriented to person, place, and time.   Psychiatric:         Mood and Affect: Mood normal.         Behavior: Behavior normal.         Thought Content: Thought content normal.           Assessment and Plan:  72 y.o. female with the following issues.    1. Acute pyelonephritis        2. Chronic back pain greater than 3 months duration  celecoxib (CELEBREX) 200 MG Cap      3. Anxiety  ALPRAZolam (XANAX) 0.5 MG Tab      4. Gastroesophageal reflux disease without esophagitis        5. Lumbar radiculopathy             Acute pyelonephritis  Patient was recently at the hospital and found to have a urinary tract infection. She states that she developed a fever, headache, malaise, and N/V. She was seen in the ER on Sunday. Labs and UA showed infection. She was provided a dose of IV antibiotics in the ER and sent home with cefdinir. She started the PO abx yesterday. She states that early this morning she had fever and continues to have periodic headaches. She also states that she currently has flank pain bilaterally.     She denies any history of kidney stones.     Patient to continue oral antibiotic. Will request urine culture from [facility].     Anxiety  Chronic stable. Patient uses xanax 1 time daily as needed for her anxiety. Patient uses this sparingly.     Obtained and reviewed patient utilization report from [facility] pharmacy database on 6/27/2023 12:01 PM  prior to writing prescription for controlled substance II, III or IV per [place] bill . Based on assessment of the report,my physical exam if necessary and the  patient's health problem, the prescription is medically necessary.     Will refill xanax 0.5mg daily as needed.       Gastroesophageal reflux disease without esophagitis  Chronic stable. Patient reports heartburn and increased acid.  She is not currently taking anything to help for this.    Start omeprazole 20mg daily.     Lumbar radiculopathy  Chronic stable.  Patient had back surgery approximately 6 months ago.  She does report improvement in pain.  She does still endorse some weakness in her lower extremities and is still using a cane to ambulate.  Patient was taking gabapentin 300 mg twice daily as well as Celebrex 200 mg twice daily.  At this time she has run out of medication.    We will refill Celebrex 200 mg 2 times daily and gabapentin 300 mg twice daily.      Return in about 6 months (around 12/27/2023).      Please note that this dictation was created using voice recognition software. I have worked with consultants from the vendor as well as technical experts from [facility] to optimize the interface. I have made every reasonable attempt to correct obvious errors, but I expect that there are errors of grammar and possibly content that I did not discover before finalizing the note.

## 2023-06-27 NOTE — ASSESSMENT & PLAN NOTE
Chronic stable.  Patient had back surgery approximately 6 months ago.  She does report improvement in pain.  She does still endorse some weakness in her lower extremities and is still using a cane to ambulate.  Patient was taking gabapentin 300 mg twice daily as well as Celebrex 200 mg twice daily.  At this time she has run out of medication.    We will refill Celebrex 200 mg 2 times daily and gabapentin 300 mg twice daily.

## 2023-09-26 ENCOUNTER — OFFICE VISIT (OUTPATIENT)
Dept: MEDICAL GROUP | Facility: PHYSICIAN GROUP | Age: 73
End: 2023-09-26
Payer: MEDICARE

## 2023-09-26 VITALS
HEIGHT: 59 IN | WEIGHT: 213.5 LBS | BODY MASS INDEX: 43.04 KG/M2 | DIASTOLIC BLOOD PRESSURE: 62 MMHG | HEART RATE: 73 BPM | OXYGEN SATURATION: 96 % | SYSTOLIC BLOOD PRESSURE: 116 MMHG | TEMPERATURE: 98.5 F | RESPIRATION RATE: 18 BRPM

## 2023-09-26 DIAGNOSIS — Z23 NEED FOR VACCINATION: ICD-10-CM

## 2023-09-26 DIAGNOSIS — D64.9 ANEMIA, UNSPECIFIED TYPE: ICD-10-CM

## 2023-09-26 DIAGNOSIS — M25.561 CHRONIC PAIN OF RIGHT KNEE: ICD-10-CM

## 2023-09-26 DIAGNOSIS — G89.29 CHRONIC PAIN OF RIGHT KNEE: ICD-10-CM

## 2023-09-26 DIAGNOSIS — I10 PRIMARY HYPERTENSION: ICD-10-CM

## 2023-09-26 DIAGNOSIS — F41.9 ANXIETY: Chronic | ICD-10-CM

## 2023-09-26 DIAGNOSIS — Z13.29 SCREENING FOR THYROID DISORDER: ICD-10-CM

## 2023-09-26 PROCEDURE — 99214 OFFICE O/P EST MOD 30 MIN: CPT | Mod: 25 | Performed by: NURSE PRACTITIONER

## 2023-09-26 PROCEDURE — 90662 IIV NO PRSV INCREASED AG IM: CPT | Performed by: NURSE PRACTITIONER

## 2023-09-26 PROCEDURE — 3078F DIAST BP <80 MM HG: CPT | Performed by: NURSE PRACTITIONER

## 2023-09-26 PROCEDURE — 3074F SYST BP LT 130 MM HG: CPT | Performed by: NURSE PRACTITIONER

## 2023-09-26 PROCEDURE — G0008 ADMIN INFLUENZA VIRUS VAC: HCPCS | Performed by: NURSE PRACTITIONER

## 2023-09-26 RX ORDER — ALPRAZOLAM 0.5 MG/1
0.5 TABLET ORAL 2 TIMES DAILY PRN
Qty: 120 TABLET | Refills: 0 | Status: SHIPPED | OUTPATIENT
Start: 2023-09-27 | End: 2023-12-26

## 2023-09-26 NOTE — ASSESSMENT & PLAN NOTE
Chronic condition. Patient has had worsening knee pain. She had an xray last year which showed tri compartment arthritis.     Discussed knee injection and diclofenac topical.     Will place referral to orthopedics.

## 2023-09-26 NOTE — PROGRESS NOTES
"  Chief Complaint   Patient presents with    Follow-Up     Follow Up visit                                                                                                                                       HPI:   Sarah presents today with the following.    Problem   Chronic Pain of Right Knee   Anxiety       Current Outpatient Medications   Medication Sig Dispense Refill    ALPRAZolam (XANAX) 0.5 MG Tab Take 1 Tablet by mouth 2 times a day as needed for Sleep or Anxiety for up to 90 days. Indications: Feeling Anxious 120 Tablet 0    celecoxib (CELEBREX) 200 MG Cap Take 1 Capsule by mouth 2 times a day. 90 Capsule 3    enalapril (VASOTEC) 10 MG Tab Take 2 Tablets by mouth every day. 90 Tablet 3    gabapentin (NEURONTIN) 300 MG Cap Take 1 Capsule by mouth 2 times a day. 180 Capsule 3    omeprazole (PRILOSEC) 20 MG delayed-release capsule Take 1 Capsule by mouth every day. 90 Capsule 3     No current facility-administered medications for this visit.       Allergies as of 09/26/2023    (No Known Allergies)        ROS:  All systems negative expect as addressed in assessment and plan.     /62 (BP Location: Right arm, Patient Position: Sitting, BP Cuff Size: Adult)   Pulse 73   Temp 36.9 °C (98.5 °F) (Temporal)   Resp 18   Ht 1.499 m (4' 11\")   Wt 96.8 kg (213 lb 8 oz)   SpO2 96%   BMI 43.12 kg/m²       Physical Exam  Vitals reviewed.   Constitutional:       Appearance: Normal appearance.   HENT:      Head: Normocephalic and atraumatic.      Mouth/Throat:      Mouth: Mucous membranes are moist.   Eyes:      Extraocular Movements: Extraocular movements intact.      Conjunctiva/sclera: Conjunctivae normal.   Pulmonary:      Effort: Pulmonary effort is normal.   Musculoskeletal:         General: Normal range of motion.      Cervical back: Normal range of motion.   Skin:     General: Skin is warm and dry.   Neurological:      General: No focal deficit present.      Mental Status: She is alert and oriented to " person, place, and time.   Psychiatric:         Mood and Affect: Mood normal.         Behavior: Behavior normal.         Thought Content: Thought content normal.           Assessment and Plan:  73 y.o. female with the following issues.    1. Chronic pain of right knee  Referral to Orthopedics      2. Primary hypertension  Comp Metabolic Panel      3. Anemia, unspecified type  CBC WITHOUT DIFFERENTIAL    IRON/TOTAL IRON BIND    FERRITIN      4. Screening for thyroid disorder  TSH    FREE THYROXINE    TRIIDOTHYRONINE      5. Anxiety  ALPRAZolam (XANAX) 0.5 MG Tab      6. Need for vaccination  INFLUENZA VACCINE, HIGH DOSE (65+ ONLY)           Chronic pain of right knee  Chronic condition. Patient has had worsening knee pain. She had an xray last year which showed tri compartment arthritis.     Discussed knee injection and diclofenac topical.     Will place referral to orthopedics.     Anxiety  Chronic stable. Patient uses xanax to help with anxiety attacks. She states that she has been taking 1-2 per day due to increased stress recently.      reviewed at time of visit.     Continue xanax 0.5mg BID PRN.       Return in about 3 months (around 12/26/2023) for anxiety.      Please note that this dictation was created using voice recognition software. I have worked with consultants from the vendor as well as technical experts from [facility] to optimize the interface. I have made every reasonable attempt to correct obvious errors, but I expect that there are errors of grammar and possibly content that I did not discover before finalizing the note.

## 2023-09-30 NOTE — ASSESSMENT & PLAN NOTE
Chronic stable. Patient uses xanax to help with anxiety attacks. She states that she has been taking 1-2 per day due to increased stress recently.      reviewed at time of visit.     Continue xanax 0.5mg BID PRN.

## 2023-10-04 PROBLEM — M17.11 TRICOMPARTMENT OSTEOARTHRITIS OF RIGHT KNEE: Status: ACTIVE | Noted: 2023-10-04

## 2023-10-05 ENCOUNTER — HOSPITAL ENCOUNTER (OUTPATIENT)
Dept: LAB | Facility: MEDICAL CENTER | Age: 73
End: 2023-10-05
Attending: NURSE PRACTITIONER
Payer: MEDICARE

## 2023-10-05 DIAGNOSIS — Z13.29 SCREENING FOR THYROID DISORDER: ICD-10-CM

## 2023-10-05 DIAGNOSIS — I10 PRIMARY HYPERTENSION: ICD-10-CM

## 2023-10-05 DIAGNOSIS — D64.9 ANEMIA, UNSPECIFIED TYPE: ICD-10-CM

## 2023-10-05 LAB
ALBUMIN SERPL BCP-MCNC: 4.7 G/DL (ref 3.2–4.9)
ALBUMIN/GLOB SERPL: 1.6 G/DL
ALP SERPL-CCNC: 115 U/L (ref 30–99)
ALT SERPL-CCNC: 13 U/L (ref 2–50)
ANION GAP SERPL CALC-SCNC: 11 MMOL/L (ref 7–16)
AST SERPL-CCNC: 12 U/L (ref 12–45)
BILIRUB SERPL-MCNC: 0.4 MG/DL (ref 0.1–1.5)
BUN SERPL-MCNC: 16 MG/DL (ref 8–22)
CALCIUM ALBUM COR SERPL-MCNC: 8.8 MG/DL (ref 8.5–10.5)
CALCIUM SERPL-MCNC: 9.4 MG/DL (ref 8.5–10.5)
CHLORIDE SERPL-SCNC: 103 MMOL/L (ref 96–112)
CO2 SERPL-SCNC: 26 MMOL/L (ref 20–33)
CREAT SERPL-MCNC: 0.64 MG/DL (ref 0.5–1.4)
ERYTHROCYTE [DISTWIDTH] IN BLOOD BY AUTOMATED COUNT: 45.6 FL (ref 35.9–50)
FERRITIN SERPL-MCNC: 310 NG/ML (ref 10–291)
GFR SERPLBLD CREATININE-BSD FMLA CKD-EPI: 94 ML/MIN/1.73 M 2
GLOBULIN SER CALC-MCNC: 3 G/DL (ref 1.9–3.5)
GLUCOSE SERPL-MCNC: 95 MG/DL (ref 65–99)
HCT VFR BLD AUTO: 41 % (ref 37–47)
HGB BLD-MCNC: 13.4 G/DL (ref 12–16)
IRON SATN MFR SERPL: 39 % (ref 15–55)
IRON SERPL-MCNC: 107 UG/DL (ref 40–170)
MCH RBC QN AUTO: 30.6 PG (ref 27–33)
MCHC RBC AUTO-ENTMCNC: 32.7 G/DL (ref 32.2–35.5)
MCV RBC AUTO: 93.6 FL (ref 81.4–97.8)
PLATELET # BLD AUTO: 223 K/UL (ref 164–446)
PMV BLD AUTO: 11 FL (ref 9–12.9)
POTASSIUM SERPL-SCNC: 4.1 MMOL/L (ref 3.6–5.5)
PROT SERPL-MCNC: 7.7 G/DL (ref 6–8.2)
RBC # BLD AUTO: 4.38 M/UL (ref 4.2–5.4)
SODIUM SERPL-SCNC: 140 MMOL/L (ref 135–145)
T3 SERPL-MCNC: 126 NG/DL (ref 60–181)
T4 FREE SERPL-MCNC: 1.38 NG/DL (ref 0.93–1.7)
TIBC SERPL-MCNC: 276 UG/DL (ref 250–450)
TSH SERPL DL<=0.005 MIU/L-ACNC: 1.67 UIU/ML (ref 0.38–5.33)
UIBC SERPL-MCNC: 169 UG/DL (ref 110–370)
WBC # BLD AUTO: 5 K/UL (ref 4.8–10.8)

## 2023-10-05 PROCEDURE — 83540 ASSAY OF IRON: CPT

## 2023-10-05 PROCEDURE — 36415 COLL VENOUS BLD VENIPUNCTURE: CPT

## 2023-10-05 PROCEDURE — 82728 ASSAY OF FERRITIN: CPT

## 2023-10-05 PROCEDURE — 84439 ASSAY OF FREE THYROXINE: CPT

## 2023-10-05 PROCEDURE — 85027 COMPLETE CBC AUTOMATED: CPT

## 2023-10-05 PROCEDURE — 84443 ASSAY THYROID STIM HORMONE: CPT

## 2023-10-05 PROCEDURE — 83550 IRON BINDING TEST: CPT

## 2023-10-05 PROCEDURE — 84480 ASSAY TRIIODOTHYRONINE (T3): CPT

## 2023-10-05 PROCEDURE — 80053 COMPREHEN METABOLIC PANEL: CPT

## 2024-03-21 ENCOUNTER — OFFICE VISIT (OUTPATIENT)
Dept: MEDICAL GROUP | Facility: PHYSICIAN GROUP | Age: 74
End: 2024-03-21
Payer: MEDICARE

## 2024-03-21 VITALS
DIASTOLIC BLOOD PRESSURE: 60 MMHG | SYSTOLIC BLOOD PRESSURE: 110 MMHG | BODY MASS INDEX: 39.2 KG/M2 | WEIGHT: 213 LBS | OXYGEN SATURATION: 96 % | HEART RATE: 78 BPM | HEIGHT: 62 IN | RESPIRATION RATE: 16 BRPM | TEMPERATURE: 97.9 F

## 2024-03-21 DIAGNOSIS — R14.2 BELCHING: ICD-10-CM

## 2024-03-21 DIAGNOSIS — R14.0 ABDOMINAL BLOATING: ICD-10-CM

## 2024-03-21 DIAGNOSIS — M25.561 CHRONIC PAIN OF RIGHT KNEE: ICD-10-CM

## 2024-03-21 DIAGNOSIS — Z12.11 ENCOUNTER FOR SCREENING FOR COLORECTAL MALIGNANT NEOPLASM: ICD-10-CM

## 2024-03-21 DIAGNOSIS — F33.41 RECURRENT MAJOR DEPRESSIVE DISORDER, IN PARTIAL REMISSION (HCC): ICD-10-CM

## 2024-03-21 DIAGNOSIS — F41.9 ANXIETY: Chronic | ICD-10-CM

## 2024-03-21 DIAGNOSIS — M17.11 TRICOMPARTMENT OSTEOARTHRITIS OF RIGHT KNEE: ICD-10-CM

## 2024-03-21 DIAGNOSIS — G89.29 CHRONIC PAIN OF RIGHT KNEE: ICD-10-CM

## 2024-03-21 DIAGNOSIS — Z12.12 ENCOUNTER FOR SCREENING FOR COLORECTAL MALIGNANT NEOPLASM: ICD-10-CM

## 2024-03-21 DIAGNOSIS — R68.81 EARLY SATIETY: ICD-10-CM

## 2024-03-21 DIAGNOSIS — Z86.19 HISTORY OF HELICOBACTER PYLORI INFECTION: ICD-10-CM

## 2024-03-21 PROCEDURE — 3074F SYST BP LT 130 MM HG: CPT | Performed by: NURSE PRACTITIONER

## 2024-03-21 PROCEDURE — 3078F DIAST BP <80 MM HG: CPT | Performed by: NURSE PRACTITIONER

## 2024-03-21 PROCEDURE — 99214 OFFICE O/P EST MOD 30 MIN: CPT | Performed by: NURSE PRACTITIONER

## 2024-03-21 PROCEDURE — 1170F FXNL STATUS ASSESSED: CPT | Performed by: NURSE PRACTITIONER

## 2024-03-21 RX ORDER — ALPRAZOLAM 0.25 MG/1
TABLET ORAL
Qty: 42 TABLET | Refills: 0 | Status: SHIPPED | OUTPATIENT
Start: 2024-03-21 | End: 2024-04-18

## 2024-03-21 ASSESSMENT — FIBROSIS 4 INDEX: FIB4 SCORE: 1.06

## 2024-03-21 ASSESSMENT — PATIENT HEALTH QUESTIONNAIRE - PHQ9: CLINICAL INTERPRETATION OF PHQ2 SCORE: 0

## 2024-03-21 NOTE — ASSESSMENT & PLAN NOTE
Obtained and reviewed patient utilization report from [facility] pharmacy database on 3/21/2024 11:10 AM  prior to writing prescription for controlled substance II, III or IV per [place] bill . Based on assessment of the report,my physical exam if necessary and the patient's health problem, the prescription is medically necessary.

## 2024-03-21 NOTE — PROGRESS NOTES
services were used in the patient's primary language of [place].     Name or Number: Jamila  Mode of interpretation: iPad      Chief Complaint   Patient presents with    Referral Needed     For [facility] for knee pain    GI Problem     Still very bloated after every a few hours later                                                                                                                                       HPI:   Sarah presents today with the following.    The patient attended the consultation today to discuss her ongoing health concerns and the effects of her current medication regimen.    The chief complaint presented by the patient is difficulty with her stomach, characterized by prolonged digestion times, with food taking up to four hours to reach her stomach. She reports experiencing burping and reflux, though she denies any trouble swallowing, diarrhea, or constipation. Additionally, the patient notes that her abdomen is bloated.    In terms of medication, the patient has been taking Alprazolam for 20 years, with recent advice from her psychiatrist to gradually decrease the dosage. She is also taking a new antidepressant medication acquired from [place], called Valdoxan. The patient mentions experiencing headaches when taking the new antidepressant in conjunction with Alprazolam and is currently taking half doses of each.    The patient has a medical history significant for an H. pylori infection, for which she received treatment six years ago.    The patient's current health management strategies, medication regimen, and concerns have been discussed, with particular attention to her stomach issues, medication side effects, and her history of H. pylori infection.    ROS:  All systems negative expect as addressed in assessment and plan.     /60 (BP Location: Left arm, Patient Position: Sitting)   Pulse 78   Temp 36.6 °C (97.9 °F) (Temporal)   Resp 16   Ht 1.575 m (5'  "2\")   Wt 96.6 kg (213 lb)   SpO2 96%   BMI 38.96 kg/m²     Objective:    Physical Exam  Vitals reviewed.   Constitutional:       Appearance: Normal appearance.   HENT:      Head: Normocephalic and atraumatic.      Mouth/Throat:      Mouth: Mucous membranes are moist.   Eyes:      Extraocular Movements: Extraocular movements intact.      Conjunctiva/sclera: Conjunctivae normal.   Pulmonary:      Effort: Pulmonary effort is normal.   Musculoskeletal:         General: Normal range of motion.      Cervical back: Normal range of motion.   Skin:     General: Skin is warm and dry.   Neurological:      General: No focal deficit present.      Mental Status: She is alert and oriented to person, place, and time.   Psychiatric:         Mood and Affect: Mood normal.         Behavior: Behavior normal.         Thought Content: Thought content normal.         Diagnostic Test Results:  - No current diagnostic test results are available as per the transcript.  - Future diagnostic tests planned include:    - A breath test aimed at detecting H. pylori infection.    - A gastric emptying study to evaluate for delayed gastric emptying.    Assessment and Plan:  73 y.o. female with the following issues.    Problem List Items Addressed This Visit       Anxiety (Chronic)     Obtained and reviewed patient utilization report from [facility] pharmacy database on 3/21/2024 11:10 AM  prior to writing prescription for controlled substance II, III or IV per [place] bill . Based on assessment of the report,my physical exam if necessary and the patient's health problem, the prescription is medically necessary.            Relevant Medications    ALPRAZolam (XANAX) 0.25 MG Tab    Chronic pain of right knee    Relevant Orders    Referral to Orthopedics    Tricompartment osteoarthritis of right knee    Relevant Orders    Referral to Orthopedics    Recurrent major depressive disorder, in partial remission (HCC)    Relevant Medications    " ALPRAZolam (XANAX) 0.25 MG Tab    Abdominal bloating    Relevant Orders    NM-GASTRIC EMPTYING    Referral to Gastroenterology    H. PYLORI BREATH TEST    Early satiety    Relevant Orders    NM-GASTRIC EMPTYING    Referral to Gastroenterology    H. PYLORI BREATH TEST    Belching    Relevant Orders    NM-GASTRIC EMPTYING    Referral to Gastroenterology    H. PYLORI BREATH TEST     Other Visit Diagnoses       Encounter for screening for colorectal malignant neoplasm        Relevant Orders    Referral to GI for Colonoscopy    History of Helicobacter pylori infection        Relevant Orders    NM-GASTRIC EMPTYING    Referral to Gastroenterology    H. PYLORI BREATH TEST             1. Knee Pain and Orthopedic Referral:     - Plan: Issue a new referral for Orthopedic evaluation to address knee pain, as it has been over six months since the last consultation.    2. Colonoscopy Screening Referral:     - Plan: Arrange for a referral to undergo colonoscopy screening as part of routine health maintenance.    3. Depression and Anxiety Management:     - Plan: Initiate a gradual discontinuation of Alprazolam over a one-month period, closely monitoring for adverse effects or symptom exacerbation. Request the patient to provide the prescription from their psychiatrist at the subsequent visit. Evaluate the necessity for a [place]-approved alternative medication.    4. Gastrointestinal Symptom Evaluation:     - Plan:        a. Order an H. pylori breath test to exclude bacterial infection.        b. Conduct a gastric emptying study to assess stomach motility.        c. Refer to a gastroenterologist for comprehensive evaluation and management, including an esophagogastroduodenoscopy (EGD) if deemed necessary.    5. Headache Management in Medication Context:     - Plan: Observe headache occurrence during and post-Alprazolam tapering. Should headaches continue, reassess the new antidepressant's role and contemplate other therapeutic  options.    6. Follow-Up Appointment:     - Plan: Schedule a review in three months to monitor the patient's adaptation to the new medication and progress with gastrointestinal symptoms. Advise the patient to contact the clinic or return earlier if symptoms escalate or new issues emerge.    HCC Gap Form    Comorbidity Diagnosis: Recurrent major depressive disorder, in partial remission (HCC)  Assessment and plan: Chronic, stable. Encouraged healthy diet and physical activity changes with a goal of weight loss. Follow up at least annually. The comorbid condition is unchanged based on today's assessment. Continue current treatment plan. Follow up at least yearly.  Last edited 03/21/24 15:45 PDT by NESTOR Krishnamurthy.           Return in about 3 months (around 6/21/2024) for Chronic Health Conditions.      Please note that this dictation was created using voice recognition software. I have worked with consultants from the vendor as well as technical experts from [facility] to optimize the interface. I have made every reasonable attempt to correct obvious errors, but I expect that there are errors of grammar and possibly content that I did not discover before finalizing the note.

## 2024-04-08 ENCOUNTER — HOSPITAL ENCOUNTER (OUTPATIENT)
Dept: RADIOLOGY | Facility: MEDICAL CENTER | Age: 74
End: 2024-04-08
Attending: NURSE PRACTITIONER
Payer: MEDICARE

## 2024-04-08 DIAGNOSIS — R14.0 ABDOMINAL BLOATING: ICD-10-CM

## 2024-04-08 DIAGNOSIS — Z86.19 HISTORY OF HELICOBACTER PYLORI INFECTION: ICD-10-CM

## 2024-04-08 DIAGNOSIS — R68.81 EARLY SATIETY: ICD-10-CM

## 2024-04-08 DIAGNOSIS — R14.2 BELCHING: ICD-10-CM

## 2024-04-08 PROCEDURE — A9541 TC99M SULFUR COLLOID: HCPCS

## 2024-04-24 ENCOUNTER — TELEPHONE (OUTPATIENT)
Dept: MEDICAL GROUP | Facility: PHYSICIAN GROUP | Age: 74
End: 2024-04-24
Payer: MEDICARE

## 2024-04-24 NOTE — TELEPHONE ENCOUNTER
Reviewing the note from her last office visit, the plan was for her to taper off the alprazolam over 1 month..  There was no plan to continue the alprazolam.     The plan was for her to bring in the [place] prescription at her next visit. Does she need a refill now?

## 2024-04-25 NOTE — TELEPHONE ENCOUNTER
"Per Madeleine:   [9:32 AM] Madeleine Simcox  \"It’s mirtazapine. She was started on an antidepressant from [place] but it’s not available in the [place]. I will send the mirtazapine. I can’t send the Xanax from my phone\"  "

## 2024-04-25 NOTE — TELEPHONE ENCOUNTER
I called and spoke with Malissa (daughter) and informed her of Madeleine's message. I cancelled their appointment on 5/1/24 since they no longer needed it.  Malissa was very thanked full and very appreciative for all the help and fast reply.

## 2024-04-25 NOTE — TELEPHONE ENCOUNTER
If she needs a refill of the [place] medication, she will need an appointment. We do not have an equivalent version in the [place], so we would have to change the medication to something similar which will require a discussion.

## 2024-06-21 ENCOUNTER — OFFICE VISIT (OUTPATIENT)
Dept: MEDICAL GROUP | Facility: PHYSICIAN GROUP | Age: 74
End: 2024-06-21
Payer: MEDICARE

## 2024-06-21 VITALS
BODY MASS INDEX: 38.64 KG/M2 | WEIGHT: 210 LBS | HEIGHT: 62 IN | RESPIRATION RATE: 16 BRPM | DIASTOLIC BLOOD PRESSURE: 60 MMHG | TEMPERATURE: 97.3 F | HEART RATE: 63 BPM | SYSTOLIC BLOOD PRESSURE: 128 MMHG | OXYGEN SATURATION: 96 %

## 2024-06-21 DIAGNOSIS — I10 PRIMARY HYPERTENSION: ICD-10-CM

## 2024-06-21 DIAGNOSIS — F41.9 ANXIETY: Chronic | ICD-10-CM

## 2024-06-21 DIAGNOSIS — F33.41 RECURRENT MAJOR DEPRESSIVE DISORDER, IN PARTIAL REMISSION (HCC): ICD-10-CM

## 2024-06-21 DIAGNOSIS — H61.899 DEBRIS IN EAR CANAL: ICD-10-CM

## 2024-06-21 DIAGNOSIS — H60.391 OTHER INFECTIVE ACUTE OTITIS EXTERNA OF RIGHT EAR: ICD-10-CM

## 2024-06-21 DIAGNOSIS — H66.011 NON-RECURRENT ACUTE SUPPURATIVE OTITIS MEDIA OF RIGHT EAR WITH SPONTANEOUS RUPTURE OF TYMPANIC MEMBRANE: ICD-10-CM

## 2024-06-21 DIAGNOSIS — R42 VERTIGO: ICD-10-CM

## 2024-06-21 RX ORDER — CIPROFLOXACIN AND DEXAMETHASONE 3; 1 MG/ML; MG/ML
4 SUSPENSION/ DROPS AURICULAR (OTIC) 2 TIMES DAILY
Qty: 7.5 ML | Refills: 0 | Status: SHIPPED | OUTPATIENT
Start: 2024-06-21 | End: 2024-06-26

## 2024-06-21 RX ORDER — ENALAPRIL MALEATE 10 MG/1
2 TABLET ORAL DAILY
COMMUNITY
End: 2024-06-21 | Stop reason: SDUPTHER

## 2024-06-21 RX ORDER — ALPRAZOLAM 0.25 MG/1
.25-.5 TABLET ORAL 2 TIMES DAILY PRN
Qty: 180 TABLET | Refills: 0 | Status: SHIPPED | OUTPATIENT
Start: 2024-06-21 | End: 2024-09-19

## 2024-06-21 RX ORDER — ENALAPRIL MALEATE 10 MG/1
20 TABLET ORAL DAILY
Qty: 180 TABLET | Refills: 3 | Status: SHIPPED | OUTPATIENT
Start: 2024-06-21

## 2024-06-21 RX ORDER — AMOXICILLIN AND CLAVULANATE POTASSIUM 875; 125 MG/1; MG/1
1 TABLET, FILM COATED ORAL 2 TIMES DAILY
Qty: 20 TABLET | Refills: 0 | Status: SHIPPED | OUTPATIENT
Start: 2024-06-21 | End: 2024-07-01

## 2024-06-21 RX ORDER — MIRTAZAPINE 15 MG/1
15 TABLET, FILM COATED ORAL NIGHTLY
Qty: 90 TABLET | Refills: 3 | Status: SHIPPED | OUTPATIENT
Start: 2024-06-21

## 2024-06-21 ASSESSMENT — PATIENT HEALTH QUESTIONNAIRE - PHQ9
8. MOVING OR SPEAKING SO SLOWLY THAT OTHER PEOPLE COULD HAVE NOTICED. OR THE OPPOSITE, BEING SO FIGETY OR RESTLESS THAT YOU HAVE BEEN MOVING AROUND A LOT MORE THAN USUAL: NOT AT ALL
1. LITTLE INTEREST OR PLEASURE IN DOING THINGS: NOT AT ALL
2. FEELING DOWN, DEPRESSED, IRRITABLE, OR HOPELESS: NOT AT ALL
SUM OF ALL RESPONSES TO PHQ QUESTIONS 1-9: 0
4. FEELING TIRED OR HAVING LITTLE ENERGY: NOT AT ALL
9. THOUGHTS THAT YOU WOULD BE BETTER OFF DEAD, OR OF HURTING YOURSELF: NOT AT ALL
7. TROUBLE CONCENTRATING ON THINGS, SUCH AS READING THE NEWSPAPER OR WATCHING TELEVISION: NOT AT ALL
6. FEELING BAD ABOUT YOURSELF - OR THAT YOU ARE A FAILURE OR HAVE LET YOURSELF OR YOUR FAMILY DOWN: NOT AL ALL
SUM OF ALL RESPONSES TO PHQ9 QUESTIONS 1 AND 2: 0
3. TROUBLE FALLING OR STAYING ASLEEP OR SLEEPING TOO MUCH: NOT AT ALL
5. POOR APPETITE OR OVEREATING: NOT AT ALL

## 2024-06-21 ASSESSMENT — FIBROSIS 4 INDEX: FIB4 SCORE: 1.06

## 2024-06-21 NOTE — PROGRESS NOTES
"  Chief Complaint   Patient presents with    Follow-Up     Chronic Health Conditions.    Otalgia     Right ear x 3 days/ was recently sick from a cold                                                                                                                                       HPI:   Sarah presents today with the following.    The patient is here today to discuss her current medication regimen and address some recent health concerns.    Ms. Smith reports that she has been taking Mirtazapine as prescribed and feels that it is providing similar benefits to the medication she was previously taking in [place]. She mentions that her sleep pattern has improved, although she still experiences sleepiness around 3 a.m.    The patient reports having recently experienced a cold, which has now resolved. However, she is still experiencing persistent ear pain and inquires about the possibility of an ear infection.    Ms. Smith also mentions that she has been experiencing bloating and getting full quickly. To address this issue, she has decided to take her last meal at 4 p.m. and not eat again until the next day.    The patient is currently taking medications for blood pressure and anxiety. She expresses concern about the pharmacy only providing her with a 28-day supply of her Alprazolam prescription instead of the prescribed 90-day supply.    The patient's current medication regimen, including Mirtazapine for sleep, as well as medications for blood pressure and anxiety, have been discussed. Additionally, her recent health concerns, such as persistent ear pain, bloating, and issues with her medication supply, have been addressed.    ROS:  All systems negative expect as addressed in assessment and plan.     /60 (BP Location: Left arm, Patient Position: Sitting)   Pulse 63   Temp 36.3 °C (97.3 °F) (Temporal)   Resp 16   Ht 1.575 m (5' 2\")   Wt 95.3 kg (210 lb)   SpO2 96%   BMI 38.41 kg/m² "     Objective:    Physical Exam  Vitals reviewed.   Constitutional:       Appearance: Normal appearance.   HENT:      Head: Normocephalic and atraumatic.      Right Ear: Drainage, swelling and tenderness present. A foreign body is present. Tympanic membrane is perforated and erythematous.      Left Ear: Tympanic membrane and ear canal normal.      Mouth/Throat:      Mouth: Mucous membranes are moist.   Eyes:      Extraocular Movements: Extraocular movements intact.      Conjunctiva/sclera: Conjunctivae normal.   Pulmonary:      Effort: Pulmonary effort is normal.   Musculoskeletal:         General: Normal range of motion.      Cervical back: Normal range of motion.   Lymphadenopathy:      Head:      Right side of head: Preauricular and posterior auricular adenopathy present.      Cervical: Cervical adenopathy present.      Right cervical: Posterior cervical adenopathy present.   Skin:     General: Skin is warm and dry.   Neurological:      General: No focal deficit present.      Mental Status: She is alert and oriented to person, place, and time.   Psychiatric:         Mood and Affect: Mood normal.         Behavior: Behavior normal.         Thought Content: Thought content normal.       Physical Examination:  - ENT (Ear, Nose, Throat): Patient reported ear pain following a recent cold. Examination revealed an ear infection involving both the ear canal and behind the eardrum, characterized by the presence of pus and a small amount of blood.    Diagnostic Test Results:  - Gastric Emptying Study: Results indicated normal gastric motility, with no evidence of delayed stomach emptying.    Assessment and Plan:  73 y.o. female with the following issues.    1. Anxiety  - ALPRAZolam (XANAX) 0.25 MG Tab; Take 1-2 Tablets by mouth 2 times a day as needed for Anxiety for up to 90 days. Indications: Feeling Anxious  Dispense: 180 Tablet; Refill: 0    2. Vertigo  - ALPRAZolam (XANAX) 0.25 MG Tab; Take 1-2 Tablets by mouth 2 times  a day as needed for Anxiety for up to 90 days. Indications: Feeling Anxious  Dispense: 180 Tablet; Refill: 0    3. Other infective acute otitis externa of right ear  - ciprofloxacin/dexamethasone (CIPRODEX) 0.3-0.1 % Suspension; Administer 4 Drops into the right ear 2 times a day for 5 days.  Dispense: 7.5 mL; Refill: 0  - amoxicillin-clavulanate (AUGMENTIN) 875-125 MG Tab; Take 1 Tablet by mouth 2 times a day for 10 days.  Dispense: 20 Tablet; Refill: 0    4. Non-recurrent acute suppurative otitis media of right ear with spontaneous rupture of tympanic membrane  - ciprofloxacin/dexamethasone (CIPRODEX) 0.3-0.1 % Suspension; Administer 4 Drops into the right ear 2 times a day for 5 days.  Dispense: 7.5 mL; Refill: 0  - amoxicillin-clavulanate (AUGMENTIN) 875-125 MG Tab; Take 1 Tablet by mouth 2 times a day for 10 days.  Dispense: 20 Tablet; Refill: 0    5. Recurrent major depressive disorder, in partial remission (HCC)  - mirtazapine (REMERON) 15 MG Tab; Take 1 Tablet by mouth every evening.  Dispense: 90 Tablet; Refill: 3    6. Primary hypertension  - enalapril (VASOTEC) 10 MG Tab; Take 2 Tablets by mouth every day.  Dispense: 180 Tablet; Refill: 3    7. Debris in ear canal  - Ear Wax Removal    1. Mirtazapine Management:     - Patient reports taking Mirtazapine as prescribed and is satisfied with its effects. No changes needed at this time.    2. Sleep Disturbance:     - Patient reports difficulty falling asleep, but overall is managing well. No intervention needed at this time.    3. Ear Infection (Otitis Media and Otitis Externa):     - Patient presents with pain in the ear following a cold. Examination reveals an ear infection in both the ear canal and behind the eardrum.     - Plan: Prescribe amoxicillin (confirm dosage) for the ear infection and antibiotic ear drops. Provide a coupon for the ear drops if insurance does not cover the cost. Schedule a follow-up appointment in six weeks to assess healing.    4.  Gastric Emptying Study Results:     - Patient informed that the test results were normal and there is no evidence of delayed gastric emptying.    5. Bloating and Early Satiety:     - Patient reports managing symptoms by adjusting meal times. No further intervention needed at this time.    6. Hypertension Management:     - Patient reports stable blood pressure on current medication. Refill prescription for blood pressure medication.    7. Anxiety Management:     - Patient reports benefit from Alprazolam. Refill prescription for Alprazolam and send to [facility] on [address] due to issues with [facility] pharmacist.    Follow-up:     - Schedule a follow-up appointment in six weeks to reevaluate the ear infection and overall health.     - Patient advised to return sooner if any concerns arise.    Return in about 6 weeks (around 8/2/2024) for recheck for ear infection.      Please note that this dictation was created using voice recognition software. I have worked with consultants from the vendor as well as technical experts from [employer] to optimize the interface. I have made every reasonable attempt to correct obvious errors, but I expect that there are errors of grammar and possibly content that I did not discover before finalizing the note.

## 2024-06-30 NOTE — PROCEDURES
Ear Wax Removal    Date/Time: 6/21/2024 3:20 PM    Performed by: NESTOR Krishnamurthy.  Authorized by: GALLITO Krishnamurthy    Anesthesia:  Local Anesthetic: none  Location details: right ear  Patient tolerance: patient tolerated the procedure well with no immediate complications  Comments: Patient reports using a clove of garlic in her ear to help with pain and inflammation. This is a common practice in her culture.     Clove of garlic removed intact with curette and alligator forceps.     Patient tolerated procedure well.   Procedure type: curette   Sedation:  Patient sedated: no

## 2024-09-25 ENCOUNTER — NON-PROVIDER VISIT (OUTPATIENT)
Dept: MEDICAL GROUP | Facility: PHYSICIAN GROUP | Age: 74
End: 2024-09-25
Payer: MEDICARE

## 2024-09-25 DIAGNOSIS — Z23 NEED FOR VACCINATION: ICD-10-CM

## 2024-09-25 PROCEDURE — G0008 ADMIN INFLUENZA VIRUS VAC: HCPCS | Performed by: INTERNAL MEDICINE

## 2024-09-25 PROCEDURE — 90656 IIV3 VACC NO PRSV 0.5 ML IM: CPT | Performed by: INTERNAL MEDICINE

## 2024-09-25 NOTE — PROGRESS NOTES
"Sarah Royal is a 74 y.o. female here for a non-provider visit for:   FLU    Reason for immunization: Annual Flu Vaccine  Immunization records indicate need for vaccine: Yes, confirmed with Epic  Minimum interval has been met for this vaccine: Yes  ABN completed: Yes    VIS Dated  08/2021 was given to patient: Yes  All IAC Questionnaire questions were answered \"No.\"    Patient tolerated injection and no adverse effects were observed or reported: Yes    Pt scheduled for next dose in series: Not Indicated    "

## 2024-10-10 ENCOUNTER — APPOINTMENT (OUTPATIENT)
Dept: MEDICAL GROUP | Facility: PHYSICIAN GROUP | Age: 74
End: 2024-10-10
Payer: MEDICARE

## 2024-10-23 ENCOUNTER — OFFICE VISIT (OUTPATIENT)
Dept: MEDICAL GROUP | Facility: PHYSICIAN GROUP | Age: 74
End: 2024-10-23
Payer: MEDICARE

## 2024-10-23 VITALS
OXYGEN SATURATION: 96 % | HEART RATE: 74 BPM | WEIGHT: 217 LBS | HEIGHT: 62 IN | DIASTOLIC BLOOD PRESSURE: 78 MMHG | TEMPERATURE: 97.9 F | SYSTOLIC BLOOD PRESSURE: 120 MMHG | BODY MASS INDEX: 39.93 KG/M2 | RESPIRATION RATE: 16 BRPM

## 2024-10-23 DIAGNOSIS — F41.9 ANXIETY: Chronic | ICD-10-CM

## 2024-10-23 DIAGNOSIS — R42 VERTIGO: ICD-10-CM

## 2024-10-23 DIAGNOSIS — H10.13 ACUTE ATOPIC CONJUNCTIVITIS OF BOTH EYES: ICD-10-CM

## 2024-10-23 DIAGNOSIS — F33.41 RECURRENT MAJOR DEPRESSIVE DISORDER, IN PARTIAL REMISSION (HCC): ICD-10-CM

## 2024-10-23 DIAGNOSIS — I10 PRIMARY HYPERTENSION: ICD-10-CM

## 2024-10-23 PROBLEM — N10 ACUTE PYELONEPHRITIS: Status: RESOLVED | Noted: 2023-06-27 | Resolved: 2024-10-23

## 2024-10-23 PROCEDURE — 3078F DIAST BP <80 MM HG: CPT | Performed by: NURSE PRACTITIONER

## 2024-10-23 PROCEDURE — 3074F SYST BP LT 130 MM HG: CPT | Performed by: NURSE PRACTITIONER

## 2024-10-23 PROCEDURE — 99214 OFFICE O/P EST MOD 30 MIN: CPT | Performed by: NURSE PRACTITIONER

## 2024-10-23 PROCEDURE — 1170F FXNL STATUS ASSESSED: CPT | Performed by: NURSE PRACTITIONER

## 2024-10-23 RX ORDER — MIRTAZAPINE 15 MG/1
15 TABLET, FILM COATED ORAL NIGHTLY
Qty: 90 TABLET | Refills: 3 | Status: SHIPPED | OUTPATIENT
Start: 2024-10-23

## 2024-10-23 RX ORDER — ALPRAZOLAM 0.25 MG/1
.25-.5 TABLET ORAL 2 TIMES DAILY PRN
Qty: 180 TABLET | Refills: 0 | Status: SHIPPED | OUTPATIENT
Start: 2024-10-23 | End: 2025-01-21

## 2024-10-23 RX ORDER — ERYTHROMYCIN 5 MG/G
1 OINTMENT OPHTHALMIC
Qty: 7 G | Refills: 1 | Status: SHIPPED | OUTPATIENT
Start: 2024-10-23

## 2024-10-23 RX ORDER — ENALAPRIL MALEATE 10 MG/1
20 TABLET ORAL DAILY
Qty: 180 TABLET | Refills: 3 | Status: SHIPPED | OUTPATIENT
Start: 2024-10-23

## 2024-10-23 RX ORDER — KETOTIFEN FUMARATE 0.35 MG/ML
1 SOLUTION/ DROPS OPHTHALMIC 2 TIMES DAILY
Qty: 10 ML | Refills: 3 | Status: SHIPPED | OUTPATIENT
Start: 2024-10-23

## 2024-10-23 ASSESSMENT — FIBROSIS 4 INDEX: FIB4 SCORE: 1.07

## 2024-10-23 NOTE — PROGRESS NOTES
"  Chief Complaint   Patient presents with    Chronic Care Management    Eye Problem     Water eyes/ getting worse x 10 days                                                                                                                                       HPI:   Sarah presents today with the following.    ***    ROS:  All systems negative expect as addressed in assessment and plan.     /78 (BP Location: Left arm, Patient Position: Sitting)   Pulse 74   Temp 36.6 °C (97.9 °F) (Temporal)   Resp 16   Ht 1.575 m (5' 2\")   Wt 98.4 kg (217 lb)   SpO2 96%   BMI 39.69 kg/m²     Objective:    Physical Exam      ***    Assessment and Plan:  74 y.o. female with the following issues.    1. Recurrent major depressive disorder, in partial remission (HCC)  - mirtazapine (REMERON) 15 MG Tab; Take 1 Tablet by mouth every evening.  Dispense: 90 Tablet; Refill: 3    2. Primary hypertension  - enalapril (VASOTEC) 10 MG Tab; Take 2 Tablets by mouth every day.  Dispense: 180 Tablet; Refill: 3    3. Anxiety  - ALPRAZolam (XANAX) 0.25 MG Tab; Take 1-2 Tablets by mouth 2 times a day as needed for Anxiety for up to 90 days. Indications: Feeling Anxious  Dispense: 180 Tablet; Refill: 0    4. Vertigo  - ALPRAZolam (XANAX) 0.25 MG Tab; Take 1-2 Tablets by mouth 2 times a day as needed for Anxiety for up to 90 days. Indications: Feeling Anxious  Dispense: 180 Tablet; Refill: 0    5. Acute atopic conjunctivitis of both eyes    Other orders  - erythromycin 5 MG/GM Ointment; Apply 1 Application to both eyes at bedtime.  Dispense: 7 g; Refill: 1  - ketotifen (ZADITOR) 0.035 % ophthalmic solution; Administer 1 Drop into both eyes 2 times a day.  Dispense: 10 mL; Refill: 3        ***    Return in about 3 months (around 1/23/2025) for Anxiety follow up, Chronic Health Conditions.    I spent a total of *** minutes with record review, exam, and communication with the patient, communication with other providers, and documentation of this " encounter. This does not include time spent on separately billable procedures/tests.    Please note that this dictation was created using voice recognition software. I have worked with consultants from the vendor as well as technical experts from [facility] to optimize the interface. I have made every reasonable attempt to correct obvious errors, but I expect that there are errors of grammar and possibly content that I did not discover before finalizing the note.        for healing.          b. Consider ENT referral if no improvement.          c. Advise her to avoid water submersion of affected ear.    5. Anxiety:     - Assessment: Current prescription is alprazolam (Tafil in [place]).     - Plan:          a. Refill alprazolam.          b. Assist with pharmacy communication as needed.    6. Eyelid Pigmented Lesion:     - Assessment: Dark spot on her left eyelid present since birth.     - Plan:          a. Monitor for changes.          b. No dermatology referral needed at present.    Follow-up:     - Plan:          a. Schedule mid-February appointment for eye check and alprazolam refill.          b. Instruct her to contact clinic as needed before appointment.    Return in about 3 months (around 1/23/2025) for Anxiety follow up, Chronic Health Conditions.      Please note that this dictation was created using voice recognition software. I have worked with consultants from the vendor as well as technical experts from [facility] to optimize the interface. I have made every reasonable attempt to correct obvious errors, but I expect that there are errors of grammar and possibly content that I did not discover before finalizing the note.

## 2025-01-20 DIAGNOSIS — F33.41 RECURRENT MAJOR DEPRESSIVE DISORDER, IN PARTIAL REMISSION (HCC): ICD-10-CM

## 2025-01-20 DIAGNOSIS — H10.13 ACUTE ATOPIC CONJUNCTIVITIS OF BOTH EYES: ICD-10-CM

## 2025-01-20 DIAGNOSIS — I10 PRIMARY HYPERTENSION: ICD-10-CM

## 2025-01-20 RX ORDER — MIRTAZAPINE 15 MG/1
15 TABLET, FILM COATED ORAL NIGHTLY
Qty: 90 TABLET | Refills: 0 | Status: SHIPPED | OUTPATIENT
Start: 2025-01-20

## 2025-01-20 RX ORDER — ERYTHROMYCIN 5 MG/G
1 OINTMENT OPHTHALMIC
Qty: 7 G | Refills: 1 | OUTPATIENT
Start: 2025-01-20

## 2025-01-20 RX ORDER — ENALAPRIL MALEATE 10 MG/1
20 TABLET ORAL DAILY
Qty: 180 TABLET | Refills: 0 | Status: SHIPPED | OUTPATIENT
Start: 2025-01-20

## 2025-01-20 RX ORDER — KETOTIFEN FUMARATE 0.35 MG/ML
1 SOLUTION/ DROPS OPHTHALMIC 2 TIMES DAILY
Qty: 10 ML | Refills: 0 | Status: SHIPPED | OUTPATIENT
Start: 2025-01-20

## 2025-01-21 ENCOUNTER — OFFICE VISIT (OUTPATIENT)
Dept: MEDICAL GROUP | Facility: PHYSICIAN GROUP | Age: 75
End: 2025-01-21
Payer: MEDICARE

## 2025-01-21 ENCOUNTER — HOSPITAL ENCOUNTER (OUTPATIENT)
Facility: MEDICAL CENTER | Age: 75
End: 2025-01-21
Payer: MEDICARE

## 2025-01-21 VITALS
SYSTOLIC BLOOD PRESSURE: 120 MMHG | TEMPERATURE: 97.6 F | OXYGEN SATURATION: 96 % | DIASTOLIC BLOOD PRESSURE: 78 MMHG | WEIGHT: 222 LBS | HEART RATE: 66 BPM | RESPIRATION RATE: 20 BRPM | BODY MASS INDEX: 39.34 KG/M2 | HEIGHT: 63 IN

## 2025-01-21 DIAGNOSIS — F41.9 ANXIETY: Chronic | ICD-10-CM

## 2025-01-21 DIAGNOSIS — Z79.899 ENCOUNTER FOR MEDICATION MANAGEMENT: ICD-10-CM

## 2025-01-21 DIAGNOSIS — R41.3 MEMORY DIFFICULTIES: ICD-10-CM

## 2025-01-21 PROCEDURE — 3078F DIAST BP <80 MM HG: CPT

## 2025-01-21 PROCEDURE — 1170F FXNL STATUS ASSESSED: CPT

## 2025-01-21 PROCEDURE — 99214 OFFICE O/P EST MOD 30 MIN: CPT

## 2025-01-21 PROCEDURE — 3074F SYST BP LT 130 MM HG: CPT

## 2025-01-21 PROCEDURE — 80307 DRUG TEST PRSMV CHEM ANLYZR: CPT

## 2025-01-21 RX ORDER — ALPRAZOLAM 0.25 MG/1
.25-.5 TABLET ORAL 2 TIMES DAILY PRN
Qty: 180 TABLET | Refills: 0 | Status: SHIPPED | OUTPATIENT
Start: 2025-01-21 | End: 2025-04-21

## 2025-01-21 ASSESSMENT — FIBROSIS 4 INDEX: FIB4 SCORE: 1.07

## 2025-01-21 ASSESSMENT — PATIENT HEALTH QUESTIONNAIRE - PHQ9: CLINICAL INTERPRETATION OF PHQ2 SCORE: 0

## 2025-01-21 NOTE — PROGRESS NOTES
"Subjective:     Chief Complaint   Patient presents with    Medication Refill   Interpretor Name: Patient's daughter  Patient preferred language used: [place]   History of Present Illness  The patient is a 74-year-old female here to follow up on controlled substance medication, requesting refills in the office today. She is accompanied by her daughter.    She has been on Xanax since 2002, initially at a dosage of 1 mg, which was subsequently reduced to 0.5 mg. Currently, she is taking 0.25 mg once daily at night, with the option to increase to twice daily if necessary. She has exhausted her current supply of the medication. She is not on any other anti-anxiety medications.     Her daughter reports that she is experiencing memory issues, often forgetting what she learned the previous day form her classes. She has an upcoming appointment scheduled with [facility] for next month for further evaluation, but states she is unable to recall information she has been learning.  Lexi with patient that the medication she is on for anxiety can cause some issues with memory loss.    MEDICATIONS  Current: Xanax (alprazolam) 0.25 mg    Allergies: Patient has no known allergies.  ROS per HPI  Health Maintenance: Deferred   Objective:     /78 (BP Location: Left arm, Patient Position: Sitting, BP Cuff Size: Adult)   Pulse 66   Temp 36.4 °C (97.6 °F) (Temporal)   Resp 20   Ht 1.6 m (5' 3\")   Wt 101 kg (222 lb)   SpO2 96%   Breastfeeding No   BMI 39.33 kg/m²  Body mass index is 39.33 kg/m².     Physical Exam  Vitals reviewed.   Constitutional:       General: She is not in acute distress.     Appearance: Normal appearance. She is not ill-appearing.   Cardiovascular:      Rate and Rhythm: Normal rate and regular rhythm.   Pulmonary:      Effort: Pulmonary effort is normal. No respiratory distress.   Skin:     Coloration: Skin is not jaundiced or pale.   Neurological:      General: No focal deficit present.      " Mental Status: She is alert and oriented to person, place, and time.   Psychiatric:         Mood and Affect: Mood normal.         Behavior: Behavior normal.         Thought Content: Thought content normal.         Judgment: Judgment normal.        Assessment and Plan:     The following treatment plan was discussed through shared decision making with the patient:    1. Anxiety  Controlled Substance Treatment Agreement    URINE DRUG SCREEN    ALPRAZolam (XANAX) 0.25 MG Tab      2. Memory difficulties  Referral to Neurology        Assessment & Plan  1. Anxiety.  Chronic, controlled.   Controlled substance visit today for alprazolam.   PDMP shows no abnormal prescribing or filling behavior.    She has been on alprazolam since 2002, currently taking 0.25 mg once at night, with the option to take it up to twice daily if needed. She has no remaining medication left. We will continue the current regimen. A prescription for a 3-month supply of alprazolam 0.25 mg, totaling 180 tablets, has been sent to her [facility] pharmacy. She will continue to follow up with her primary provider, Claudette [facility], upon her return. A controlled substance agreement was signed today, and a urine drug screen will be conducted in the office to ensure compliance with the medication regimen.  Risks versus benefits were reviewed.   Obtained and reviewed patient utilization report from [facility] pharmacy database on 1/21/2025 8:10 AM  prior to writing prescription for controlled substance II, III or IV per [place] bill . Based on assessment of the report,my physical exam if necessary and the patient's health problem, the prescription is medically necessary.     2. Memory loss.  She is experiencing memory issues, which may be worsened or related to her anxiety medication. A referral to neurology has been made for further evaluation of her memory loss. She is advised to contact the referrals department by the end of the week if she does not receive a  call to schedule the appointment.    Return in about 3 months (around 4/21/2025) for Controlled Substance with PCP.       Please note that this note was created using dictation with voice recognition software. I have made every reasonable attempt to correct obvious errors, but I expect that there are errors of grammar and possibly content that I did not discover before finalizing the note.    NILSA Baker  [facility]  [facility]

## 2025-01-21 NOTE — ASSESSMENT & PLAN NOTE
Obtained and reviewed patient utilization report from [facility] pharmacy database on 1/21/2025 8:10 AM  prior to writing prescription for controlled substance II, III or IV per [place] bill . Based on assessment of the report, the prescription is medically necessary.

## 2025-01-24 LAB
AMPHET CTO UR CFM-MCNC: NEGATIVE NG/ML
BARBITURATES CTO UR CFM-MCNC: NEGATIVE NG/ML
BENZODIAZ CTO UR CFM-MCNC: NEGATIVE NG/ML
CANNABINOIDS CTO UR CFM-MCNC: NEGATIVE NG/ML
COCAINE CTO UR CFM-MCNC: NEGATIVE NG/ML
CREAT UR-MCNC: 39.1 MG/DL (ref 20–400)
DRUG COMMENT 753798: NORMAL
METHADONE CTO UR CFM-MCNC: NEGATIVE NG/ML
OPIATES CTO UR CFM-MCNC: NEGATIVE NG/ML
PCP CTO UR CFM-MCNC: NEGATIVE NG/ML
PROPOXYPH CTO UR CFM-MCNC: NEGATIVE NG/ML

## 2025-02-03 ENCOUNTER — TELEPHONE (OUTPATIENT)
Dept: HEALTH INFORMATION MANAGEMENT | Facility: OTHER | Age: 75
End: 2025-02-03
Payer: MEDICARE

## 2025-03-07 ENCOUNTER — APPOINTMENT (OUTPATIENT)
Dept: MEDICAL GROUP | Facility: PHYSICIAN GROUP | Age: 75
End: 2025-03-07
Payer: MEDICARE

## 2025-03-07 VITALS
HEART RATE: 75 BPM | RESPIRATION RATE: 14 BRPM | SYSTOLIC BLOOD PRESSURE: 130 MMHG | BODY MASS INDEX: 40.04 KG/M2 | HEIGHT: 63 IN | DIASTOLIC BLOOD PRESSURE: 80 MMHG | WEIGHT: 226 LBS | OXYGEN SATURATION: 95 % | TEMPERATURE: 97.9 F

## 2025-03-07 DIAGNOSIS — Z13.1 ENCOUNTER FOR SCREENING FOR DIABETES MELLITUS: ICD-10-CM

## 2025-03-07 DIAGNOSIS — Z13.29 SCREENING FOR THYROID DISORDER: ICD-10-CM

## 2025-03-07 DIAGNOSIS — F33.41 RECURRENT MAJOR DEPRESSIVE DISORDER, IN PARTIAL REMISSION (HCC): ICD-10-CM

## 2025-03-07 DIAGNOSIS — H25.9 AGE-RELATED CATARACT OF BOTH EYES, UNSPECIFIED AGE-RELATED CATARACT TYPE: ICD-10-CM

## 2025-03-07 DIAGNOSIS — G47.09 OTHER INSOMNIA: ICD-10-CM

## 2025-03-07 DIAGNOSIS — D12.3 ADENOMATOUS POLYP OF TRANSVERSE COLON: ICD-10-CM

## 2025-03-07 DIAGNOSIS — E55.9 VITAMIN D DEFICIENCY: ICD-10-CM

## 2025-03-07 DIAGNOSIS — Z13.220 ENCOUNTER FOR SCREENING FOR LIPID DISORDER: ICD-10-CM

## 2025-03-07 DIAGNOSIS — R51.9 NOCTURNAL HEADACHES: ICD-10-CM

## 2025-03-07 DIAGNOSIS — I10 PRIMARY HYPERTENSION: ICD-10-CM

## 2025-03-07 DIAGNOSIS — K25.9 GASTRIC ULCER WITHOUT HEMORRHAGE OR PERFORATION, UNSPECIFIED CHRONICITY: ICD-10-CM

## 2025-03-07 DIAGNOSIS — R14.0 ABDOMINAL BLOATING: ICD-10-CM

## 2025-03-07 PROBLEM — R68.81 EARLY SATIETY: Status: RESOLVED | Noted: 2024-03-21 | Resolved: 2025-03-07

## 2025-03-07 PROBLEM — R14.2 BELCHING: Status: RESOLVED | Noted: 2024-03-21 | Resolved: 2025-03-07

## 2025-03-07 PROBLEM — H10.13 ACUTE ATOPIC CONJUNCTIVITIS OF BOTH EYES: Status: RESOLVED | Noted: 2024-10-23 | Resolved: 2025-03-07

## 2025-03-07 PROBLEM — N63.20 MASS OF BREAST, LEFT: Status: RESOLVED | Noted: 2021-07-12 | Resolved: 2025-03-07

## 2025-03-07 PROCEDURE — 99214 OFFICE O/P EST MOD 30 MIN: CPT | Performed by: NURSE PRACTITIONER

## 2025-03-07 PROCEDURE — 3079F DIAST BP 80-89 MM HG: CPT | Performed by: NURSE PRACTITIONER

## 2025-03-07 PROCEDURE — 1170F FXNL STATUS ASSESSED: CPT | Performed by: NURSE PRACTITIONER

## 2025-03-07 PROCEDURE — 3075F SYST BP GE 130 - 139MM HG: CPT | Performed by: NURSE PRACTITIONER

## 2025-03-07 RX ORDER — SIMETHICONE 125 MG
125 TABLET,CHEWABLE ORAL EVERY 6 HOURS PRN
Qty: 120 TABLET | Refills: 3 | Status: SHIPPED | OUTPATIENT
Start: 2025-03-07

## 2025-03-07 RX ORDER — SUCRALFATE 1 G/1
1 TABLET ORAL
Qty: 90 TABLET | Refills: 5 | Status: SHIPPED | OUTPATIENT
Start: 2025-03-07

## 2025-03-07 RX ORDER — CELECOXIB 200 MG/1
1 CAPSULE ORAL 2 TIMES DAILY
COMMUNITY
End: 2025-03-07

## 2025-03-07 RX ORDER — MIRTAZAPINE 15 MG/1
15 TABLET, FILM COATED ORAL NIGHTLY
Qty: 90 TABLET | Refills: 0 | Status: SHIPPED | OUTPATIENT
Start: 2025-03-07

## 2025-03-07 RX ORDER — CIPROFLOXACIN AND DEXAMETHASONE 3; 1 MG/ML; MG/ML
SUSPENSION/ DROPS AURICULAR (OTIC)
COMMUNITY
End: 2025-03-07

## 2025-03-07 RX ORDER — OMEPRAZOLE 20 MG/1
CAPSULE, DELAYED RELEASE ORAL
COMMUNITY
Start: 2025-03-06

## 2025-03-07 RX ORDER — CEFDINIR 300 MG/1
CAPSULE ORAL
COMMUNITY
End: 2025-03-07

## 2025-03-07 RX ORDER — POLYETHYLENE GLYCOL 3350, SODIUM SULFATE ANHYDROUS, SODIUM BICARBONATE, SODIUM CHLORIDE, POTASSIUM CHLORIDE 236; 22.74; 6.74; 5.86; 2.97 G/4L; G/4L; G/4L; G/4L; G/4L
POWDER, FOR SOLUTION ORAL
COMMUNITY
Start: 2025-01-28

## 2025-03-07 RX ORDER — GABAPENTIN 300 MG/1
1 CAPSULE ORAL 2 TIMES DAILY
COMMUNITY
End: 2025-03-07

## 2025-03-07 RX ORDER — LOSARTAN POTASSIUM 25 MG/1
25 TABLET ORAL DAILY
Qty: 100 TABLET | Refills: 3 | Status: SHIPPED | OUTPATIENT
Start: 2025-03-07 | End: 2026-04-11

## 2025-03-07 ASSESSMENT — FIBROSIS 4 INDEX: FIB4 SCORE: 1.07

## 2025-03-07 NOTE — PROGRESS NOTES
Chief Complaint   Patient presents with    Follow-Up     3 Months     Headache     X 1 week     Requesting Labs                                                                                                                                       HPI:   Sarah presents today with the following.    Patient consented to the use of [employer] to record and transcribe notes during this visit.      The patient is here today for evaluation of severe headaches and memory issues.    The patient reports experiencing severe nocturnal headaches, occurring at midnight and 3 AM, which wake them from sleep. Morning headaches are alleviated by coffee, and they do not experience daytime headaches. The patient also reports frequent nighttime awakenings. They have been diagnosed with a gastric ulcer via endoscopy and report feeling gassy while taking omeprazole. Multiple colonic polyps were removed during a colonoscopy, with most being precancerous. The patient has bilateral cataracts and has been referred to a neurologist for memory issues.    The patient's medical history includes a gastric ulcer, colonic polyps (10 removed, most precancerous), depression, bilateral cataracts, and memory issues. Their current medications include losartan 25mg BID, omeprazole, mirtazapine (taken at night for depression and sleep), and Xanax (for depression).    Family history is significant for colon cancer in the patient's paternal aunt, who is .    The review of systems reveals nocturnal and morning headaches, memory issues, gassiness, bilateral cataracts, and frequent awakenings, sometimes due to headaches.    The patient's current health concerns, including severe headaches, memory issues, and gastrointestinal symptoms, have been discussed. Their medication regimen, medical history, and family history of colon cancer have also been reviewed.    ROS:  All systems negative expect as addressed in assessment and plan.     /80 (BP Location:  "Right arm, Patient Position: Sitting, BP Cuff Size: Adult)   Pulse 75   Temp 36.6 °C (97.9 °F) (Temporal)   Resp 14   Ht 1.6 m (5' 3\")   Wt 103 kg (226 lb)   SpO2 95%   BMI 40.03 kg/m²     Objective:    Diagnostic Test Results:  - Laboratory Tests:    - H. pylori test: Negative      - EGD/Colonoscopy:      - Revealed gastric ulcer at fundus      - Multiple colonic polyps identified      - 10 polyps removed, mostly precancerous    STOPBANG - Sleep Apnea Screening      Flowsheet Row Most Recent Value   S - Have you been told that you SNORE? Yes   T - Are you often TIRED during the day? Yes   O - Do you know if you stop breathing or has anyone witnessed you stop breathing while you were asleep? (OBSTRUCTION) Yes   P - Do you have high blood PRESSURE or on medication to control high blood pressure? Yes   B - Is your Body Mass Index greater than 35? (BMI) Yes   A - Are you 50 years old or older? (AGE) Yes   N - Are you a male with a NECK circumference greater than 17 inches, or a female with a neck circumference greater than 16 inches? Yes   G - Are you male? (GENDER) No   STOPBANG Total Score 7   ARIANNE Risk High Risk              Assessment and Plan:  74 y.o. female with the following issues.    1. Nocturnal headaches  - Overnight Home Sleep Study; Future  - Referral to Pulmonary and Sleep Medicine  - Comp Metabolic Panel; Future  - CBC WITHOUT DIFFERENTIAL; Future  - IRON/TOTAL IRON BIND; Future  - FERRITIN; Future  - VITAMIN B12; Future    2. Adenomatous polyp of transverse colon  - CBC WITHOUT DIFFERENTIAL; Future  - IRON/TOTAL IRON BIND; Future  - FERRITIN; Future  - VITAMIN B12; Future    3. Abdominal bloating  - simethicone (MYLICON) 125 MG chewable tablet; Chew 1 Tablet every 6 hours as needed for Flatulence.  Dispense: 120 Tablet; Refill: 3    4. Gastric ulcer without hemorrhage or perforation, unspecified chronicity  - sucralfate (CARAFATE) 1 GM Tab; Take 1 Tablet by mouth 3 times a day before meals. " Dissolve in 1-2oz of warm water. Drink 5-10 minutes prior to a meal.  Dispense: 90 Tablet; Refill: 5    5. Age-related cataract of both eyes, unspecified age-related cataract type  - Referral to Ophthalmology    6. Recurrent major depressive disorder, in partial remission (HCC)  - mirtazapine (REMERON) 15 MG Tab; Take 1 Tablet by mouth every evening.  Dispense: 90 Tablet; Refill: 0    7. Primary hypertension  - losartan (COZAAR) 25 MG Tab; Take 1 Tablet by mouth every day.  Dispense: 100 Tablet; Refill: 3  - MICROALBUMIN CREAT RATIO URINE; Future    8. Other insomnia  - mirtazapine (REMERON) 15 MG Tab; Take 1 Tablet by mouth every evening.  Dispense: 90 Tablet; Refill: 0    9. Encounter for screening for diabetes mellitus  - Comp Metabolic Panel; Future  - HEMOGLOBIN A1C; Future    10. Encounter for screening for lipid disorder  - Lipid Profile; Future    11. Screening for thyroid disorder  - TSH; Future  - FREE THYROXINE; Future    12. Vitamin D deficiency  - VITAMIN D,25 HYDROXY (DEFICIENCY); Future    Other orders  - omeprazole (PRILOSEC) 20 MG delayed-release capsule  - PEG 3350-KCl-NaBcb-NaCl-NaSulf (PEG-3350/ELECTROLYTES) 236 g Recon Soln; FOLLOW GI CONSULTANTS INSTRUCTION HANDOUT. PATIENT IS TO ALSO BUY SIMETHICONE OTC FOR PREP.      1. Nocturnal Headaches:     - Patient reports severe headaches at 3 AM, waking her up. Absent during day.     - Timing and frequent nighttime awakenings suggest possible ARIANNE.     - Headaches improve with morning coffee.     - Plan:       - Order sleep study for ARIANNE evaluation       - Arrange sleep study equipment pickup/delivery       - Await results for further management    2. Hypertension:     - Switched from enalapril to losartan 25 mg BID in [place].     - BP usually good, rare headaches.     - Nocturnal headaches may/may not be BP-related.     - Plan:       - Continue losartan 25 mg BID       - Encourage home BP monitoring       - Reassess BP control at next visit    3.  Gastric Ulcer and Colonic Polyps:     - Recent endoscopy: ulcer at stomach top, multiple colonic polyps.     - H. pylori negative.     - 10 polyps removed, mostly precancerous.     - Improvement with omeprazole but increased gas.     - Family history: colon cancer in paternal aunt.     - Plan:       - Continue omeprazole       - Simethicone PRN for gas       - Sucralfate dissolved in warm water, 5-10 min before meals       - Follow-up colonoscopy in 1 year       - Educate on colonoscopy surveillance importance    4. Cataracts:     - Patient notices cataracts in both eyes.     - Plan: Refer to ophthalmologist for evaluation and potential surgery    5. Memory Concerns:     - Patient reports forgetfulness.     - Possible ARIANNE contribution to cognitive issues.     - Plan:       - Neurology appointment with Dr. Franco in April       - Await sleep study and neurology evaluation results    6. Depression:     - Taking mirtazapine at night for depression/sleep.     - Xanax started in [place], replacing Tafil (alprazolam).     - Plan:       - Continue mirtazapine at night and Xanax as prescribed       - Monitor regimen effectiveness and side effects    Return in about 3 months (around 6/7/2025).      Please note that this dictation was created using voice recognition software. I have worked with consultants from the vendor as well as technical experts from [employer] to optimize the interface. I have made every reasonable attempt to correct obvious errors, but I expect that there are errors of grammar and possibly content that I did not discover before finalizing the note.

## 2025-04-04 ENCOUNTER — TELEPHONE (OUTPATIENT)
Dept: HEALTH INFORMATION MANAGEMENT | Facility: OTHER | Age: 75
End: 2025-04-04
Payer: MEDICARE

## 2025-05-15 ENCOUNTER — HOSPITAL ENCOUNTER (OUTPATIENT)
Dept: LAB | Facility: MEDICAL CENTER | Age: 75
End: 2025-05-15
Attending: NURSE PRACTITIONER
Payer: MEDICARE

## 2025-05-15 DIAGNOSIS — E55.9 VITAMIN D DEFICIENCY: ICD-10-CM

## 2025-05-15 DIAGNOSIS — Z13.220 ENCOUNTER FOR SCREENING FOR LIPID DISORDER: ICD-10-CM

## 2025-05-15 DIAGNOSIS — Z13.1 ENCOUNTER FOR SCREENING FOR DIABETES MELLITUS: ICD-10-CM

## 2025-05-15 DIAGNOSIS — D12.3 ADENOMATOUS POLYP OF TRANSVERSE COLON: ICD-10-CM

## 2025-05-15 DIAGNOSIS — I10 PRIMARY HYPERTENSION: ICD-10-CM

## 2025-05-15 DIAGNOSIS — Z13.29 SCREENING FOR THYROID DISORDER: ICD-10-CM

## 2025-05-15 DIAGNOSIS — R51.9 NOCTURNAL HEADACHES: ICD-10-CM

## 2025-05-15 LAB
25(OH)D3 SERPL-MCNC: 21 NG/ML (ref 30–100)
ALBUMIN SERPL BCP-MCNC: 4.2 G/DL (ref 3.2–4.9)
ALBUMIN/GLOB SERPL: 1.3 G/DL
ALP SERPL-CCNC: 119 U/L (ref 30–99)
ALT SERPL-CCNC: 23 U/L (ref 2–50)
ANION GAP SERPL CALC-SCNC: 10 MMOL/L (ref 7–16)
AST SERPL-CCNC: 25 U/L (ref 12–45)
BILIRUB SERPL-MCNC: 0.4 MG/DL (ref 0.1–1.5)
BUN SERPL-MCNC: 16 MG/DL (ref 8–22)
CALCIUM ALBUM COR SERPL-MCNC: 9.2 MG/DL (ref 8.5–10.5)
CALCIUM SERPL-MCNC: 9.4 MG/DL (ref 8.5–10.5)
CHLORIDE SERPL-SCNC: 104 MMOL/L (ref 96–112)
CO2 SERPL-SCNC: 25 MMOL/L (ref 20–33)
CREAT SERPL-MCNC: 0.67 MG/DL (ref 0.5–1.4)
CREAT UR-MCNC: 23.2 MG/DL
ERYTHROCYTE [DISTWIDTH] IN BLOOD BY AUTOMATED COUNT: 44.4 FL (ref 35.9–50)
EST. AVERAGE GLUCOSE BLD GHB EST-MCNC: 131 MG/DL
FERRITIN SERPL-MCNC: 274 NG/ML (ref 10–291)
GFR SERPLBLD CREATININE-BSD FMLA CKD-EPI: 92 ML/MIN/1.73 M 2
GLOBULIN SER CALC-MCNC: 3.2 G/DL (ref 1.9–3.5)
GLUCOSE SERPL-MCNC: 105 MG/DL (ref 65–99)
HBA1C MFR BLD: 6.2 % (ref 4–5.6)
HCT VFR BLD AUTO: 40.7 % (ref 37–47)
HGB BLD-MCNC: 13.6 G/DL (ref 12–16)
IRON SATN MFR SERPL: 24 % (ref 15–55)
IRON SERPL-MCNC: 68 UG/DL (ref 40–170)
MCH RBC QN AUTO: 31 PG (ref 27–33)
MCHC RBC AUTO-ENTMCNC: 33.4 G/DL (ref 32.2–35.5)
MCV RBC AUTO: 92.7 FL (ref 81.4–97.8)
MICROALBUMIN UR-MCNC: <1.2 MG/DL
MICROALBUMIN/CREAT UR: NORMAL MG/G (ref 0–30)
PLATELET # BLD AUTO: 271 K/UL (ref 164–446)
PMV BLD AUTO: 10.2 FL (ref 9–12.9)
POTASSIUM SERPL-SCNC: 4.1 MMOL/L (ref 3.6–5.5)
PROT SERPL-MCNC: 7.4 G/DL (ref 6–8.2)
RBC # BLD AUTO: 4.39 M/UL (ref 4.2–5.4)
SODIUM SERPL-SCNC: 139 MMOL/L (ref 135–145)
TIBC SERPL-MCNC: 280 UG/DL (ref 250–450)
UIBC SERPL-MCNC: 212 UG/DL (ref 110–370)
VIT B12 SERPL-MCNC: 912 PG/ML (ref 211–911)
WBC # BLD AUTO: 6.4 K/UL (ref 4.8–10.8)

## 2025-05-15 PROCEDURE — 83540 ASSAY OF IRON: CPT

## 2025-05-15 PROCEDURE — 80053 COMPREHEN METABOLIC PANEL: CPT

## 2025-05-15 PROCEDURE — 83036 HEMOGLOBIN GLYCOSYLATED A1C: CPT | Mod: GA

## 2025-05-15 PROCEDURE — 82043 UR ALBUMIN QUANTITATIVE: CPT

## 2025-05-15 PROCEDURE — 83550 IRON BINDING TEST: CPT

## 2025-05-15 PROCEDURE — 85027 COMPLETE CBC AUTOMATED: CPT

## 2025-05-15 PROCEDURE — 82607 VITAMIN B-12: CPT

## 2025-05-15 PROCEDURE — 36415 COLL VENOUS BLD VENIPUNCTURE: CPT

## 2025-05-15 PROCEDURE — 82306 VITAMIN D 25 HYDROXY: CPT

## 2025-05-15 PROCEDURE — 82728 ASSAY OF FERRITIN: CPT

## 2025-05-15 PROCEDURE — 82570 ASSAY OF URINE CREATININE: CPT

## 2025-06-09 ENCOUNTER — RESULTS FOLLOW-UP (OUTPATIENT)
Dept: MEDICAL GROUP | Facility: PHYSICIAN GROUP | Age: 75
End: 2025-06-09

## 2025-06-16 ENCOUNTER — OFFICE VISIT (OUTPATIENT)
Dept: MEDICAL GROUP | Facility: PHYSICIAN GROUP | Age: 75
End: 2025-06-16
Payer: MEDICARE

## 2025-06-16 VITALS
SYSTOLIC BLOOD PRESSURE: 126 MMHG | TEMPERATURE: 97.4 F | WEIGHT: 215.5 LBS | DIASTOLIC BLOOD PRESSURE: 84 MMHG | HEART RATE: 72 BPM | HEIGHT: 63 IN | BODY MASS INDEX: 38.18 KG/M2 | OXYGEN SATURATION: 99 % | RESPIRATION RATE: 16 BRPM

## 2025-06-16 DIAGNOSIS — H10.13 ACUTE ATOPIC CONJUNCTIVITIS OF BOTH EYES: ICD-10-CM

## 2025-06-16 DIAGNOSIS — I10 PRIMARY HYPERTENSION: ICD-10-CM

## 2025-06-16 DIAGNOSIS — H04.203 EXCESSIVE TEAR PRODUCTION OF BOTH LACRIMAL GLANDS: ICD-10-CM

## 2025-06-16 DIAGNOSIS — N95.1 MENOPAUSAL STATE: ICD-10-CM

## 2025-06-16 DIAGNOSIS — F33.41 RECURRENT MAJOR DEPRESSIVE DISORDER, IN PARTIAL REMISSION (HCC): ICD-10-CM

## 2025-06-16 DIAGNOSIS — Z78.0 POSTMENOPAUSAL STATUS (AGE-RELATED) (NATURAL): ICD-10-CM

## 2025-06-16 DIAGNOSIS — Z12.31 ENCOUNTER FOR SCREENING MAMMOGRAM FOR BREAST CANCER: ICD-10-CM

## 2025-06-16 DIAGNOSIS — R73.03 PRE-DIABETES: Primary | ICD-10-CM

## 2025-06-16 DIAGNOSIS — G47.09 OTHER INSOMNIA: ICD-10-CM

## 2025-06-16 DIAGNOSIS — H57.9 ITCHY EYES: ICD-10-CM
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
## 2025-06-16 PROCEDURE — 1170F FXNL STATUS ASSESSED: CPT | Performed by: FAMILY MEDICINE

## 2025-06-16 PROCEDURE — 99214 OFFICE O/P EST MOD 30 MIN: CPT | Performed by: FAMILY MEDICINE

## 2025-06-16 PROCEDURE — 3074F SYST BP LT 130 MM HG: CPT | Performed by: FAMILY MEDICINE

## 2025-06-16 PROCEDURE — 3079F DIAST BP 80-89 MM HG: CPT | Performed by: FAMILY MEDICINE

## 2025-06-16 RX ORDER — LOSARTAN POTASSIUM 25 MG/1
25 TABLET ORAL DAILY
Qty: 100 TABLET | Refills: 3 | Status: SHIPPED | OUTPATIENT
Start: 2025-06-16 | End: 2026-07-21

## 2025-06-16 RX ORDER — MIRTAZAPINE 15 MG/1
15 TABLET, FILM COATED ORAL NIGHTLY
Qty: 100 TABLET | Refills: 3 | Status: SHIPPED | OUTPATIENT
Start: 2025-06-16

## 2025-06-16 RX ORDER — OMEPRAZOLE 20 MG/1
20 CAPSULE, DELAYED RELEASE ORAL DAILY
Qty: 100 CAPSULE | Refills: 3 | Status: SHIPPED | OUTPATIENT
Start: 2025-06-16

## 2025-06-16 RX ORDER — KETOTIFEN FUMARATE 0.35 MG/ML
1 SOLUTION/ DROPS OPHTHALMIC 2 TIMES DAILY
Qty: 10 ML | Refills: 0 | Status: SHIPPED | OUTPATIENT
Start: 2025-06-16

## 2025-06-16 ASSESSMENT — FIBROSIS 4 INDEX: FIB4 SCORE: 1.38

## 2025-06-16 NOTE — PROGRESS NOTES
"Verbal consent was acquired by the patient to use [employer] ambient listening note generation during this visit YES    Subjective:     HPI:   History of Present Illness    This is a 74-year-old female, Hebrew-speaking patient, new to me and here to establish care.  Visit was conducted with the assistance of a  via telehealth.  Patient's medical history reviewed in chart.  Patient presents to establish care, and is wanting to go over panel of labs ordered by her prior PCP in May 2025.       The patient presents for a comprehensive review of her medical history and to discuss recent laboratory results. She is accompanied by her  and an .    Prediabetes  - The patient has been managing prediabetes for the past year, which she attributes to increased body mass.  - She reports a weight gain of 20 pounds following the administration of corticosteroids for a knee condition.    Anxiety  - She is currently prescribed Remeron (mirtazapine), which she takes at night to aid sleep and manage her mood.      Hypertension  - She is on losartan 25 mg, one tablet daily for hypertension management.    Pruritus and Allergic Conjunctivitis  - The patient experiences pruritus and constant watering/tearing of her eyes, consistent with allergic conjunctivitis.  - She is not currently using any allergy eye drops.    Supplemental information: She does not take any vitamin D supplements.      Health Maintenance: ordered    Objective:     Exam:  /84 (BP Location: Right arm, Patient Position: Sitting, BP Cuff Size: Large adult)   Pulse 72   Temp 36.3 °C (97.4 °F) (Temporal)   Resp 16   Ht 1.6 m (5' 3\")   Wt 97.8 kg (215 lb 8 oz)   SpO2 99%   BMI 38.17 kg/m²  Body mass index is 38.17 kg/m².    Physical Exam  Vitals and nursing note reviewed.   Constitutional:       Appearance: Normal appearance.      Comments: Ambulates with cane   HENT:      Head: Normocephalic and atraumatic. "   Cardiovascular:      Rate and Rhythm: Normal rate and regular rhythm.   Pulmonary:      Effort: Pulmonary effort is normal.      Breath sounds: Normal breath sounds.   Neurological:      Mental Status: She is alert. Mental status is at baseline.   Psychiatric:         Mood and Affect: Mood normal.             Results  Reviewed all labs with patient from May 2025 - ordered by prior PCP    Assessment & Plan:     1. Pre-diabetes        2. Primary hypertension  losartan (COZAAR) 25 MG Tab      3. Acute atopic conjunctivitis of both eyes  ketotifen (ZADITOR) 0.035 % ophthalmic solution      4. Encounter for screening mammogram for breast cancer  MA-SCREENING MAMMO BILAT W/CAD      5. Postmenopausal status (age-related) (natural)  DS-BONE DENSITY STUDY (DEXA)      6. Menopausal state  DS-BONE DENSITY STUDY (DEXA)      7. Excessive tear production of both lacrimal glands  Referral to Ophthalmology      8. Itchy eyes  Referral to Ophthalmology      9. Recurrent major depressive disorder, in partial remission (HCC)  mirtazapine (REMERON) 15 MG Tab      10. Other insomnia  mirtazapine (REMERON) 15 MG Tab          Assessment & Plan  1. Prediabetes: Chronic.  - Advised to reduce intake of sugars and carbohydrates to manage the condition.  - Monitoring dietary changes and weight management.    2. Vitamin D deficiency.  - Recommended to take an over-the-counter vitamin D supplement daily at dosing of 4,000 IU daily    3. Itchy, watery eyes. Likely allergic conjunctivitis.   - Prescription for allergy eye drops will be provided to manage symptoms.  - Referral to an ophthalmologist will be made at patient request.     4. Anxiety - managed with Remeron at bedtime. Refill sent.     5. HTN - chronic, stable. Has apparently been well controlled with just Losartan 25mg daily. UTD on monitoring labs.     6. Health maintenance.  - Overdue for baseline DEXA, which was ordered.   - Overdue for mammogram. Sounds as though she has had some  done in [place] in the interim, but I do not have those records.       Follow-up  - Referral to an ophthalmologist. Trial of Zaditor  - Mammogram.  - Bone density test.          No follow-ups on file.    Please note that this dictation was created using voice recognition software. I have made every reasonable attempt to correct obvious errors, but I expect that there are errors of grammar and possibly content that I did not discover before finalizing the note.

## 2025-06-16 NOTE — PATIENT INSTRUCTIONS
- Vitamin D3 4,000 IU daily    - I have placed a referral for an eye doctor, but in the meantime I have also sent some allergy eye drops to see if these will help with your itchy, watery eyes

## 2025-06-26 ENCOUNTER — OFFICE VISIT (OUTPATIENT)
Dept: NEUROLOGY | Facility: MEDICAL CENTER | Age: 75
End: 2025-06-26
Attending: PSYCHIATRY & NEUROLOGY
Payer: MEDICARE

## 2025-06-26 VITALS
BODY MASS INDEX: 38.16 KG/M2 | HEART RATE: 79 BPM | SYSTOLIC BLOOD PRESSURE: 124 MMHG | HEIGHT: 63 IN | OXYGEN SATURATION: 95 % | WEIGHT: 215.39 LBS | DIASTOLIC BLOOD PRESSURE: 70 MMHG | TEMPERATURE: 96.9 F

## 2025-06-26 DIAGNOSIS — R41.89 COGNITIVE IMPAIRMENT: Primary | ICD-10-CM

## 2025-06-26 PROCEDURE — 99215 OFFICE O/P EST HI 40 MIN: CPT

## 2025-06-26 ASSESSMENT — ENCOUNTER SYMPTOMS
INSOMNIA: 0
TREMORS: 0
HEADACHES: 0
MEMORY LOSS: 1
DEPRESSION: 0
DIZZINESS: 0
HALLUCINATIONS: 0
FALLS: 0

## 2025-06-26 ASSESSMENT — FIBROSIS 4 INDEX: FIB4 SCORE: 1.38

## 2025-06-26 ASSESSMENT — PATIENT HEALTH QUESTIONNAIRE - PHQ9: CLINICAL INTERPRETATION OF PHQ2 SCORE: 0

## 2025-06-26 NOTE — PROGRESS NOTES
Subjective     Sarah Royal is a 74 y.o. female who presents with her daughter Malissa, from the office of Dr. Yee Martin MD, for consultation, with a history of progressive memory decline.  Primarily Polish-speaking, translation services were offered,  Floyd Kincaid, #[id number].  Her daughter also chimes in.     JENNIFER Francis is a very pleasant 74-year-old right-handed mix American woman who has noted over the last year slow change with short-term memory retention.  She recognizes the difficulties, she does something and almost immediately forgets.  This could be reading, engaging in conversation, or simply being told something.  Frequent reminders are required, and even then recall is imperfect.  She states she does not write things down but in fact her daughter states she does; and if she does not, she would forget more.  Multitasking can be done, and though not distracted easily, things are being left incomplete more often.  She completes routine tasks but it seems to take a little longer.  Her daughter suspects that if she had to learn something new such as use of a new appliance, she would have great difficulty doing so.  The patient's  also has taken a more active role in almost everything which has allowed her to remain relatively independent.    Medications are taken consistently now that her  follows its along.  They now both have to do their finances together.  She is independent with her ADLs.  Balance is curtailed mostly because of arthritis involving the right knee but also back.  She does not fall with regularity.  She does need a calendar, her daughter helps maintain this.  Around the house she appears organized but has been putting things back in the wrong place.  When she and her  have gone to the store to shop, they have actually gotten lost.  She actually has mistaken the names of her children a bit more commonly, though it is still infrequent.  Enlarged groups  of people she seems to do well.      She has not done anything inherently dangerous at home such as leave the house with the doors open and unlocked, stove on, water running, she has been making mistakes while cooking such as over salting food.  She eats well.  Sleeping has not been distorted.  There are no issues with agitation or hallucinations, mood has been stable though she is also on antidepressants.  Bowel and bladder control are maintained.  They deny anything close to RBD behaviors while asleep.    Her workup has been limited, though vitamin B12, folate and TSH values were recently checked, for other reasons, and found to be negative.  Imaging studies of the brain has not been done.  She has not seen any other specialist.  She has not been treated.    She has a history of hypertension and a right Bell's palsy, no history of CVA, CAD, PVD, malignancy, MS, seizure, malignancy, autoimmune disease, diabetes, hypertension, neurodegenerative disease, pulmonary disease including ARIANNE, liver or kidney disease, or blood dyscrasia.    There is no surgical history of note from my standpoint.    She is not aware of anyone in her family who has ever suffered from memory loss or who has held a diagnosis of dementia.    She is a retired nurse.  She does not smoke or drink.  She lives with her .  They have been getting along with family's help.    She is on Remeron, Cozaar, Prilosec and a multivitamin.    Review of Systems   Constitutional:  Negative for malaise/fatigue.   Musculoskeletal:  Negative for falls.   Neurological:  Negative for dizziness, tremors and headaches.   Psychiatric/Behavioral:  Positive for memory loss. Negative for depression, hallucinations and suicidal ideas. The patient does not have insomnia.    All other systems reviewed and are negative.    Objective     /70 (BP Location: Right arm, Patient Position: Sitting, BP Cuff Size: Large adult)   Pulse 79   Temp 36.1 °C (96.9 °F)  "(Temporal)   Ht 1.6 m (5' 3\") Comment: pt reported  Wt 97.7 kg (215 lb 6.2 oz)   SpO2 95%   BMI 38.15 kg/m²      Physical Exam    She appears in no acute distress.  She is very pleasant in spirit and demeanor, quite cooperative.  She is clean and appropriately dressed.  She maintains good eye contact throughout the interview.  Vital signs are stable.  There is no malar rash, jaw or temporal tenderness, or jaw claudication.  The neck is supple, range of motion is full.  Carotid pulses are present without asymmetry.  Cardiac evaluation reveals a regular rhythm.     Neurological Exam    Language differences make in-depth cognitive assessment difficult at best.  She is partially oriented, she states the month is February, date the 27th the year is 25, she knows her date of birth and her age.  She knows that we are in the clinic which is in [place].  She identifies her daughter by name, she knows her other children by name.  Basic naming is done easily, she does not make mistakes.  She can repeat a simple sentence.  There is no right or left confusion.  There is no apraxia.  Word fluency seems to be intact.  Her mood is euthymic, affect is mood appropriate.    PERRLA/EOMI, visual fields are full to movement detection on confrontation bilaterally.  Funduscopic exam reveals a sharp disc margin on the left.  Facial movements show asymmetry, reduced on the left with a flattening of the nasolabial fold and slight weakness of eye closure and wrinkling of the forehead.  Sensory exam is intact to temperature and light touch.  The tongue and uvula are midline, lip apposition symmetric, lateral tongue movements are intact.  Jaw movements are intact.  Shoulder shrug and head rotation are symmetric.    Musculoskeletal exam reveals normal tone, there is no asterixis or drift.  There is no bradykinesia.  There is no tremor at rest or with action.  Strength is intact in all 4 extremities at 5/5.    Reflexes are diminished throughout, " there are no obvious asymmetry from side-to-side, the ankle jerks are only trace present bilaterally.  The toes are downgoing bilaterally.    She walks with some discomfort related to her back as well as the right knee.  Still, station is slightly widened, tandem walking is an impossibility because of body habitus.  Armswing is symmetric.  Stride length is shortened but she does not shuffle.  There is no appendicular dystaxia.  Repetitive movements are symmetric.    Sensory exam is intact to vibration and temperature.  Romberg is absent.  Assessment & Plan  Cognitive impairment  Based on the history as well as the limited findings on examination, I suspect she does have cognitive impairment, the issue is how severe and to what degree each of the cognitive domains are involved.  This seems to be a slowly progressive process, consistent with a possible degenerative illness.  Structural pathology such as ischemic disease, NPH, inflammation, etc. also need to be ruled out.  I do not think this is related to nonconvulsive, intermittent seizure activity so I do not think an EEG study will prove beneficial.  This is not medication related, nor do I believe she suffers from severe depression.    As described by her daughter, there seems to be involvement of multiple domains including not just short-term memory encoding as well as memory recall but also complex learning, executive dysfunction with absence of insight, etc.  More in-depth neuropsychological testing will be ordered, this with a bilingual specialist, Dr.Davor Shaikh, PhD.  MRI scan of the brain will be ordered.    The rationale for the diagnostics being done was reviewed in full.  They were told that she does have cognitive impairment though she does not fit criteria for dementia.  We are now looking for conditions that can cause cognitive decline as well as better define the nature of the problem she has.    In the meantime, she was encouraged to continue her  present lifestyle.  She is active, she sleeps well, eats regularly, and engages socially.  She continues with her home routines along with her .  There are no specific medications that are indicated for her present symptoms.  We will follow-up over the next 4 to 5 months.    Orders:    Referral to Behavioral Health    MR-BRAIN-W/O; Future    Plan: 60 minutes in total spent on patient care including pre-charting, record review, discussion with healthcare staff and documentation.  This includes face-to-face time for exam, review, discussion, as well as counseling and coordinating care.

## 2025-06-26 NOTE — Clinical Note
Abby, need your help on this. I think this pt dies have multi-domain cognitive impairment, but my Tajik is lacking so I can't really do anything of real meaning. Thanks. Herve

## 2025-06-26 NOTE — ASSESSMENT & PLAN NOTE
Based on the history as well as the limited findings on examination, I suspect she does have cognitive impairment, the issue is how severe and to what degree each of the cognitive domains are involved.  This seems to be a slowly progressive process, consistent with a possible degenerative illness.  Structural pathology such as ischemic disease, NPH, inflammation, etc. also need to be ruled out.  I do not think this is related to nonconvulsive, intermittent seizure activity so I do not think an EEG study will prove beneficial.  This is not medication related, nor do I believe she suffers from severe depression.    As described by her daughter, there seems to be involvement of multiple domains including not just short-term memory encoding as well as memory recall but also complex learning, executive dysfunction with absence of insight, etc.  More in-depth neuropsychological testing will be ordered, this with a bilingual specialist, Dr.Davor Shaikh, PhD.  MRI scan of the brain will be ordered.    The rationale for the diagnostics being done was reviewed in full.  They were told that she does have cognitive impairment though she does not fit criteria for dementia.  We are now looking for conditions that can cause cognitive decline as well as better define the nature of the problem she has.    In the meantime, she was encouraged to continue her present lifestyle.  She is active, she sleeps well, eats regularly, and engages socially.  She continues with her home routines along with her .  There are no specific medications that are indicated for her present symptoms.  We will follow-up over the next 4 to 5 months.    Orders:    Referral to Behavioral Health    MR-BRAIN-W/O; Future